# Patient Record
Sex: MALE | Race: BLACK OR AFRICAN AMERICAN | Employment: FULL TIME | URBAN - METROPOLITAN AREA
[De-identification: names, ages, dates, MRNs, and addresses within clinical notes are randomized per-mention and may not be internally consistent; named-entity substitution may affect disease eponyms.]

---

## 2017-07-07 LAB — HBA1C MFR BLD HPLC: 6.6 %

## 2017-09-05 ENCOUNTER — DOCUMENTATION ONLY (OUTPATIENT)
Dept: FAMILY MEDICINE CLINIC | Facility: CLINIC | Age: 57
End: 2017-09-05

## 2017-09-18 ENCOUNTER — OFFICE VISIT (OUTPATIENT)
Dept: FAMILY MEDICINE CLINIC | Facility: CLINIC | Age: 57
End: 2017-09-18

## 2017-09-18 VITALS
BODY MASS INDEX: 41.85 KG/M2 | HEIGHT: 72 IN | SYSTOLIC BLOOD PRESSURE: 152 MMHG | RESPIRATION RATE: 20 BRPM | OXYGEN SATURATION: 97 % | HEART RATE: 64 BPM | TEMPERATURE: 98.2 F | WEIGHT: 309 LBS | DIASTOLIC BLOOD PRESSURE: 80 MMHG

## 2017-09-18 DIAGNOSIS — M17.0 OSTEOARTHRITIS OF BOTH KNEES, UNSPECIFIED OSTEOARTHRITIS TYPE: ICD-10-CM

## 2017-09-18 DIAGNOSIS — E11.9 CONTROLLED TYPE 2 DIABETES MELLITUS WITHOUT COMPLICATION, WITHOUT LONG-TERM CURRENT USE OF INSULIN (HCC): Primary | ICD-10-CM

## 2017-09-18 DIAGNOSIS — M25.562 PAIN IN BOTH KNEES, UNSPECIFIED CHRONICITY: ICD-10-CM

## 2017-09-18 DIAGNOSIS — F32.A DEPRESSION, UNSPECIFIED DEPRESSION TYPE: ICD-10-CM

## 2017-09-18 DIAGNOSIS — M25.561 PAIN IN BOTH KNEES, UNSPECIFIED CHRONICITY: ICD-10-CM

## 2017-09-18 DIAGNOSIS — Z76.89 ENCOUNTER TO ESTABLISH CARE: ICD-10-CM

## 2017-09-18 DIAGNOSIS — I10 HTN, GOAL BELOW 130/80: ICD-10-CM

## 2017-09-18 LAB
HBA1C MFR BLD HPLC: 6.4 %
MICROALBUMIN UR TEST STR-MCNC: 10 MG/L
MICROALBUMIN/CREAT RATIO POC: <30 MG/G

## 2017-09-18 RX ORDER — LISINOPRIL AND HYDROCHLOROTHIAZIDE 20; 25 MG/1; MG/1
TABLET ORAL DAILY
COMMUNITY
End: 2017-09-18 | Stop reason: ALTCHOICE

## 2017-09-18 RX ORDER — IBUPROFEN 600 MG/1
TABLET ORAL AS NEEDED
COMMUNITY
Start: 2017-08-03 | End: 2018-05-21 | Stop reason: ALTCHOICE

## 2017-09-18 RX ORDER — LISINOPRIL 20 MG/1
20 TABLET ORAL DAILY
Qty: 90 TAB | Refills: 1 | Status: SHIPPED | OUTPATIENT
Start: 2017-09-18 | End: 2018-01-22 | Stop reason: ALTCHOICE

## 2017-09-18 RX ORDER — LORATADINE 10 MG/1
10 TABLET ORAL
COMMUNITY
End: 2018-01-22

## 2017-09-18 RX ORDER — TAMSULOSIN HYDROCHLORIDE 0.4 MG/1
CAPSULE ORAL
COMMUNITY
Start: 2017-08-03 | End: 2018-01-22 | Stop reason: SDUPTHER

## 2017-09-18 RX ORDER — NIFEDIPINE 60 MG/1
60 TABLET, EXTENDED RELEASE ORAL DAILY
Qty: 90 TAB | Refills: 1 | Status: SHIPPED | OUTPATIENT
Start: 2017-09-18 | End: 2018-04-23 | Stop reason: ALTCHOICE

## 2017-09-18 RX ORDER — ASPIRIN 81 MG/1
81 TABLET ORAL DAILY
Qty: 120 TAB | Refills: 1 | Status: SHIPPED | OUTPATIENT
Start: 2017-09-18 | End: 2018-03-23 | Stop reason: SDUPTHER

## 2017-09-18 RX ORDER — GABAPENTIN 300 MG/1
600 CAPSULE ORAL
COMMUNITY
End: 2018-01-22

## 2017-09-18 RX ORDER — OXYMETAZOLINE HCL 0.05 %
2 SPRAY, NON-AEROSOL (ML) NASAL 2 TIMES DAILY
COMMUNITY
End: 2018-01-22

## 2017-09-18 RX ORDER — MELATONIN
DAILY
COMMUNITY
End: 2018-01-22

## 2017-09-18 RX ORDER — FLUTICASONE PROPIONATE 50 MCG
2 SPRAY, SUSPENSION (ML) NASAL DAILY
COMMUNITY
End: 2018-02-28 | Stop reason: SDUPTHER

## 2017-09-18 RX ORDER — BUPROPION HYDROCHLORIDE 150 MG/1
TABLET, EXTENDED RELEASE ORAL 2 TIMES DAILY
COMMUNITY
End: 2018-01-22

## 2017-09-18 RX ORDER — TRAMADOL HYDROCHLORIDE 50 MG/1
50 TABLET ORAL
COMMUNITY
End: 2019-07-02

## 2017-09-18 NOTE — PATIENT INSTRUCTIONS
Arthritis: Care Instructions  Your Care Instructions  Arthritis, also called osteoarthritis, is a breakdown of the cartilage that cushions your joints. When the cartilage wears down, your bones rub against each other. This causes pain and stiffness. Many people have some arthritis as they age. Arthritis most often affects the joints of the spine, hands, hips, knees, or feet. You can take simple measures to protect your joints, ease your pain, and help you stay active. Follow-up care is a key part of your treatment and safety. Be sure to make and go to all appointments, and call your doctor if you are having problems. It's also a good idea to know your test results and keep a list of the medicines you take. How can you care for yourself at home? · Stay at a healthy weight. Being overweight puts extra strain on your joints. · Talk to your doctor or physical therapist about exercises that will help ease joint pain. ¨ Stretch. You may enjoy gentle forms of yoga to help keep your joints and muscles flexible. ¨ Walk instead of jog. Other types of exercise that are less stressful on the joints include riding a bicycle, swimming, catalino chi, or water exercise. ¨ Lift weights. Strong muscles help reduce stress on your joints. Stronger thigh muscles, for example, take some of the stress off of the knees and hips. Learn the right way to lift weights so you do not make joint pain worse. · Take your medicines exactly as prescribed. Call your doctor if you think you are having a problem with your medicine. · Take pain medicines exactly as directed. ¨ If the doctor gave you a prescription medicine for pain, take it as prescribed. ¨ If you are not taking a prescription pain medicine, ask your doctor if you can take an over-the-counter medicine. · Use a cane, crutch, walker, or another device if you need help to get around. These can help rest your joints.  You also can use other things to make life easier, such as a higher toilet seat and padded handles on kitchen utensils. · Do not sit in low chairs, which can make it hard to get up. · Put heat or cold on your sore joints as needed. Use whichever helps you most. You also can take turns with hot and cold packs. ¨ Apply heat 2 or 3 times a day for 20 to 30 minutesusing a heating pad, hot shower, or hot packto relieve pain and stiffness. ¨ Put ice or a cold pack on your sore joint for 10 to 20 minutes at a time. Put a thin cloth between the ice and your skin. When should you call for help? Call your doctor now or seek immediate medical care if:  · You have sudden swelling, warmth, or pain in any joint. · You have joint pain and a fever or rash. · You have such bad pain that you cannot use a joint. Watch closely for changes in your health, and be sure to contact your doctor if:  · You have mild joint symptoms that continue even with more than 6 weeks of care at home. · You have stomach pain or other problems with your medicine. Where can you learn more? Go to http://melisaPin-Digitaljerome.info/. Enter K138 in the search box to learn more about \"Arthritis: Care Instructions. \"  Current as of: November 28, 2016  Content Version: 11.3  © 8262-3265 [employer]. Care instructions adapted under license by [employer] (which disclaims liability or warranty for this information). If you have questions about a medical condition or this instruction, always ask your healthcare professional. [place] [address] any warranty or liability for your use of this information. Type 2 Diabetes: Care Instructions  Your Care Instructions  Type 2 diabetes is a disease that develops when the body's tissues cannot use insulin properly. Over time, the pancreas cannot make enough insulin. Insulin is a hormone that helps the body's cells use sugar (glucose) for energy.  It also helps the body store extra sugar in muscle, fat, and liver cells.  Without insulin, the sugar cannot get into the cells to do its work. It stays in the blood instead. This can cause high blood sugar levels. A person has diabetes when the blood sugar stays too high too much of the time. Over time, diabetes can lead to diseases of the heart, blood vessels, nerves, kidneys, and eyes. You may be able to control your blood sugar by losing weight, eating a healthy diet, and getting daily exercise. You may also have to take insulin or other diabetes medicine. Follow-up care is a key part of your treatment and safety. Be sure to make and go to all appointments. Call your doctor if you are having problems. It's also a good idea to know your test results and keep a list of the medicines you take. How can you care for yourself at home? · Keep your blood sugar at a target level (which you set with your doctor). ¨ Eat a good diet that spreads carbohydrate throughout the day. Carbohydratethe body's main source of fuelaffects blood sugar more than any other nutrient. Carbohydrate is in fruits, vegetables, milk, and yogurt. It also is in breads, cereals, vegetables such as potatoes and corn, and sugary foods such as candy and cakes. ¨ Aim for 30 minutes of exercise on most, preferably all, days of the week. Walking is a good choice. You also may want to do other activities, such as running, swimming, cycling, or playing tennis or team sports. If your doctor says it's okay, do muscle-strengthening exercises at least 2 times a week. ¨ Take your medicines exactly as prescribed. Call your doctor if you think you are having a problem with your medicine. You will get more details on the specific medicines your doctor prescribes. · Check your blood sugar as often as your doctor recommends. It is important to keep track of any symptoms you have, such as low blood sugar. Also tell your doctor if you have any changes in your activities, diet, or insulin use.   · Talk to your doctor before you start taking aspirin every day. Aspirin can help certain people lower their risk of a heart attack or stroke. But taking aspirin isn't right for everyone, because it can cause serious bleeding. · Do not smoke. If you need help quitting, talk to your doctor about stop-smoking programs and medicines. These can increase your chances of quitting for good. · Keep your cholesterol and blood pressure at normal levels. You may need to take one or more medicines to reach your goals. Take them exactly as directed. Do not stop or change a medicine without talking to your doctor first.  When should you call for help? Call 911 anytime you think you may need emergency care. For example, call if:  · You passed out (lost consciousness), or you suddenly become very sleepy or confused. (You may have very low blood sugar.)  Call your doctor now or seek immediate medical care if:  · Your blood sugar is 300 mg/dL or is higher than the level your doctor has set for you. · You have symptoms of low blood sugar, such as:  ¨ Sweating. ¨ Feeling nervous, shaky, and weak. ¨ Extreme hunger and slight nausea. ¨ Dizziness and headache. ¨ Blurred vision. ¨ Confusion. Watch closely for changes in your health, and be sure to contact your doctor if:  · You often have problems controlling your blood sugar. · You have symptoms of long-term diabetes problems, such as:  ¨ New vision changes. ¨ New pain, numbness, or tingling in your hands or feet. ¨ Skin problems. Where can you learn more? Go to http://melisa-jerome.info/. Enter C553 in the search box to learn more about \"Type 2 Diabetes: Care Instructions. \"  Current as of: March 13, 2017  Content Version: 11.3  © 5079-2699 [employer]. Care instructions adapted under license by [employer] (which disclaims liability or warranty for this information).  If you have questions about a medical condition or this instruction, always ask your healthcare professional. [address] any warranty or liability for your use of this information.

## 2017-09-18 NOTE — MR AVS SNAPSHOT
Visit Information Date & Time Provider Department Dept. Phone Encounter #  
 9/18/2017  3:30 PM Jayla Hahn NP [facility] [phone] [id number] Follow-up Instructions Return in about 2 weeks (around 10/2/2017), or if symptoms worsen or fail to improve, for HTN. Upcoming Health Maintenance Date Due Hepatitis C Screening 1960 DTaP/Tdap/Td series (1 - Tdap) 4/22/1981 FOBT Q 1 YEAR AGE 50-75 4/22/2010 INFLUENZA AGE 9 TO ADULT 8/1/2017 Allergies as of 9/18/2017  Review Complete On: 9/18/2017 By: Kirk Gonzalez Not on File Current Immunizations  Never Reviewed No immunizations on file. Not reviewed this visit You Were Diagnosed With   
  
 Codes Comments Controlled type 2 diabetes mellitus without complication, without long-term current use of insulin ([facility]    -  Primary ICD-10-CM: E11.9 ICD-9-CM: 250.00   
 HTN, goal below 130/80     ICD-10-CM: I10 
ICD-9-CM: 401.9 Osteoarthritis of both knees, unspecified osteoarthritis type     ICD-10-CM: M17.0 ICD-9-CM: 715.96 Pain in both knees, unspecified chronicity     ICD-10-CM: M25.561, M25.562 ICD-9-CM: 719.46 Vitals BP Pulse Temp Resp Height(growth percentile) Weight(growth percentile) 152/80 64 98.2 °F (36.8 °C) 20 5' 11.5\" (1.816 m) 309 lb (140.2 kg) SpO2 BMI Smoking Status 97% 42.5 kg/m2 Never Smoker Vitals History BMI and BSA Data Body Mass Index Body Surface Area 42.5 kg/m 2 2.66 m 2 Preferred Pharmacy Pharmacy Name Phone [address], [facility] [address] [phone] Your Updated Medication List  
  
   
This list is accurate as of: 9/18/17  3:59 PM.  Always use your most recent med list.  
  
  
  
  
 aspirin delayed-release 81 mg tablet Take 1 Tab by mouth daily. lisinopril 20 mg tablet Commonly known as:  Tang Cassidy  
 Take 1 Tab by mouth daily. NIFEdipine ER 60 mg ER tablet Commonly known as:  ADALAT CC Take 1 Tab by mouth daily. tamsulosin 0.4 mg capsule Commonly known as:  FLOMAX Prescriptions Sent to Pharmacy Refills  
 lisinopril (PRINIVIL, ZESTRIL) 20 mg tablet 1 Sig: Take 1 Tab by mouth daily. Class: Normal  
 Pharmacy: [address] [place] [address] Ph #: [phone] Route: Oral  
 NIFEdipine ER (ADALAT CC) 60 mg ER tablet 1 Sig: Take 1 Tab by mouth daily. Class: Normal  
 Pharmacy: [address] [place] [address] Ph #: [phone] Route: Oral  
 aspirin delayed-release 81 mg tablet 1 Sig: Take 1 Tab by mouth daily. Class: Normal  
 Pharmacy: [address] [place] [address] Ph #: [phone] Route: Oral  
  
We Performed the Following AMB POC HEMOGLOBIN A1C [02137 CPT(R)] AMB POC URINE, MICROALBUMIN, SEMIQUANTITATIVE [55058 CPT(R)] REFERRAL TO OPHTHALMOLOGY [REF57 Custom] Follow-up Instructions Return in about 2 weeks (around 10/2/2017), or if symptoms worsen or fail to improve, for HTN. Referral Information Referral ID Referred By Referred To  
  
 [id number] Christin Prado [address], [address] [place] [address] [phone] Phone: [phone] Fax: [phone] Visits Status Start Date End Date 1 New Request 9/18/17 9/18/18 If your referral has a status of pending review or denied, additional information will be sent to support the outcome of this decision. Patient Instructions Arthritis: Care Instructions Your Care Instructions Arthritis, also called osteoarthritis, is a breakdown of the cartilage that cushions your joints.  When the cartilage wears down, your bones rub against each other. This causes pain and stiffness. Many people have some arthritis as they age. Arthritis most often affects the joints of the spine, hands, hips, knees, or feet. You can take simple measures to protect your joints, ease your pain, and help you stay active. Follow-up care is a key part of your treatment and safety. Be sure to make and go to all appointments, and call your doctor if you are having problems. It's also a good idea to know your test results and keep a list of the medicines you take. How can you care for yourself at home? · Stay at a healthy weight. Being overweight puts extra strain on your joints. · Talk to your doctor or physical therapist about exercises that will help ease joint pain. ¨ Stretch. You may enjoy gentle forms of yoga to help keep your joints and muscles flexible. ¨ Walk instead of jog. Other types of exercise that are less stressful on the joints include riding a bicycle, swimming, catalino chi, or water exercise. ¨ Lift weights. Strong muscles help reduce stress on your joints. Stronger thigh muscles, for example, take some of the stress off of the knees and hips. Learn the right way to lift weights so you do not make joint pain worse. · Take your medicines exactly as prescribed. Call your doctor if you think you are having a problem with your medicine. · Take pain medicines exactly as directed. ¨ If the doctor gave you a prescription medicine for pain, take it as prescribed. ¨ If you are not taking a prescription pain medicine, ask your doctor if you can take an over-the-counter medicine. · Use a cane, crutch, walker, or another device if you need help to get around. These can help rest your joints. You also can use other things to make life easier, such as a higher toilet seat and padded handles on kitchen utensils. · Do not sit in low chairs, which can make it hard to get up. · Put heat or cold on your sore joints as needed.  Use whichever helps you most. You also can take turns with hot and cold packs. ¨ Apply heat 2 or 3 times a day for 20 to 30 minutesusing a heating pad, hot shower, or hot packto relieve pain and stiffness. ¨ Put ice or a cold pack on your sore joint for 10 to 20 minutes at a time. Put a thin cloth between the ice and your skin. When should you call for help? Call your doctor now or seek immediate medical care if: 
· You have sudden swelling, warmth, or pain in any joint. · You have joint pain and a fever or rash. · You have such bad pain that you cannot use a joint. Watch closely for changes in your health, and be sure to contact your doctor if: 
· You have mild joint symptoms that continue even with more than 6 weeks of care at home. · You have stomach pain or other problems with your medicine. Where can you learn more? Go to http://melisaAR LLCjerome.info/. Enter G454 in the search box to learn more about \"Arthritis: Care Instructions. \" Current as of: November 28, 2016 Content Version: 11.3 © 2499-2156 [employer]. Care instructions adapted under license by [employer] (which disclaims liability or warranty for this information). If you have questions about a medical condition or this instruction, always ask your healthcare professional. [address] any warranty or liability for your use of this information. Type 2 Diabetes: Care Instructions Your Care Instructions Type 2 diabetes is a disease that develops when the body's tissues cannot use insulin properly. Over time, the pancreas cannot make enough insulin. Insulin is a hormone that helps the body's cells use sugar (glucose) for energy. It also helps the body store extra sugar in muscle, fat, and liver cells. Without insulin, the sugar cannot get into the cells to do its work. It stays in the blood instead. This can cause high blood sugar levels.  A person has diabetes when the blood sugar stays too high too much of the time. Over time, diabetes can lead to diseases of the heart, blood vessels, nerves, kidneys, and eyes. You may be able to control your blood sugar by losing weight, eating a healthy diet, and getting daily exercise. You may also have to take insulin or other diabetes medicine. Follow-up care is a key part of your treatment and safety. Be sure to make and go to all appointments. Call your doctor if you are having problems. It's also a good idea to know your test results and keep a list of the medicines you take. How can you care for yourself at home? · Keep your blood sugar at a target level (which you set with your doctor). ¨ Eat a good diet that spreads carbohydrate throughout the day. Carbohydratethe body's main source of fuelaffects blood sugar more than any other nutrient. Carbohydrate is in fruits, vegetables, milk, and yogurt. It also is in breads, cereals, vegetables such as potatoes and corn, and sugary foods such as candy and cakes. ¨ Aim for 30 minutes of exercise on most, preferably all, days of the week. Walking is a good choice. You also may want to do other activities, such as running, swimming, cycling, or playing tennis or team sports. If your doctor says it's okay, do muscle-strengthening exercises at least 2 times a week. ¨ Take your medicines exactly as prescribed. Call your doctor if you think you are having a problem with your medicine. You will get more details on the specific medicines your doctor prescribes. · Check your blood sugar as often as your doctor recommends. It is important to keep track of any symptoms you have, such as low blood sugar. Also tell your doctor if you have any changes in your activities, diet, or insulin use. · Talk to your doctor before you start taking aspirin every day. Aspirin can help certain people lower their risk of a heart attack or stroke.  But taking aspirin isn't right for everyone, because it can cause serious bleeding. · Do not smoke. If you need help quitting, talk to your doctor about stop-smoking programs and medicines. These can increase your chances of quitting for good. · Keep your cholesterol and blood pressure at normal levels. You may need to take one or more medicines to reach your goals. Take them exactly as directed. Do not stop or change a medicine without talking to your doctor first. 
When should you call for help? Call 911 anytime you think you may need emergency care. For example, call if: 
· You passed out (lost consciousness), or you suddenly become very sleepy or confused. (You may have very low blood sugar.) Call your doctor now or seek immediate medical care if: 
· Your blood sugar is 300 mg/dL or is higher than the level your doctor has set for you. · You have symptoms of low blood sugar, such as: ¨ Sweating. ¨ Feeling nervous, shaky, and weak. ¨ Extreme hunger and slight nausea. ¨ Dizziness and headache. ¨ Blurred vision. ¨ Confusion. Watch closely for changes in your health, and be sure to contact your doctor if: 
· You often have problems controlling your blood sugar. · You have symptoms of long-term diabetes problems, such as: ¨ New vision changes. ¨ New pain, numbness, or tingling in your hands or feet. ¨ Skin problems. Where can you learn more? Go to http://melisa-jerome.info/. Enter C553 in the search box to learn more about \"Type 2 Diabetes: Care Instructions. \" Current as of: March 13, 2017 Content Version: 11.3 © 9892-9866 [employer]. Care instructions adapted under license by [employer] (which disclaims liability or warranty for this information). If you have questions about a medical condition or this instruction, always ask your healthcare professional. [address] any warranty or liability for your use of this information. Introducing [facility]! Daryl Jovel introduces [employer] patient portal. Now you can access parts of your medical record, email your doctor's office, and request medication refills online. 1. In your internet browser, go to [email or web address] 2. Click on the First Time User? Click Here link in the Sign In box. You will see the New Member Sign Up page. 3. Enter your [employer] Access Code exactly as it appears below. You will not need to use this code after youve completed the sign-up process. If you do not sign up before the expiration date, you must request a new code. · [employer] Access Code: [email or web address] Expires: 11/30/2017 10:37 AM 
 
4. Enter the last four digits of your Social Security Number (xxxx) and Date of Birth (mm/dd/yyyy) as indicated and click Submit. You will be taken to the next sign-up page. 5. Create a [employer] ID. This will be your [employer] login ID and cannot be changed, so think of one that is secure and easy to remember. 6. Create a [employer] password. You can change your password at any time. 7. Enter your Password Reset Question and Answer. This can be used at a later time if you forget your password. 8. Enter your e-mail address. You will receive e-mail notification when new information is available in [address] 9. Click Sign Up. You can now view and download portions of your medical record. 10. Click the Download Summary menu link to download a portable copy of your medical information. If you have questions, please visit the Frequently Asked Questions section of the [employer] website. Remember, [employer] is NOT to be used for urgent needs. For medical emergencies, dial 911. Now available from your iPhone and Android! Please provide this summary of care documentation to your next provider. Your primary care clinician is listed as [address] [facility] [employer]. If you have any questions after today's visit, please call [phone].

## 2017-09-19 PROBLEM — E11.9 CONTROLLED TYPE 2 DIABETES MELLITUS WITHOUT COMPLICATION, WITHOUT LONG-TERM CURRENT USE OF INSULIN (HCC): Status: ACTIVE | Noted: 2017-09-19

## 2017-09-19 PROBLEM — F32.A DEPRESSION: Status: ACTIVE | Noted: 2017-09-19

## 2017-09-19 PROBLEM — I10 HTN, GOAL BELOW 130/80: Status: ACTIVE | Noted: 2017-09-19

## 2017-09-19 PROBLEM — M17.0 OSTEOARTHRITIS OF BOTH KNEES: Status: ACTIVE | Noted: 2017-09-19

## 2017-09-19 NOTE — PROGRESS NOTES
HISTORY OF PRESENT ILLNESS  Chilo Harris is a 62 y.o. male. HPI Comments: New pt to practice. Presents with pertinent PMHx to include HTN, DM, osteoarthritis of both knees-requiring future surgery, depression, GERD. HTN: elevated BP today, he has not been taking his Nifidipine daily since running out of this. He takes his lisinopril/hctz daily however this makes his urinate a lot. He notes that he sometimes has to stop while [place] the [place] to find a bathroom. Denies any leg swelling or palpitation. Notes substernal chest pain every other day which lasts about 40 - 50 mins. He reports this is occurs at rest or with activity. Denies any radiation of pain. He does not take any medication for this, since it resolves on its own. He admits he has been stressed a lot with some home-life issues. DM: not medicated at this time. A1c today is 6.4, he has been trying to manage this with weight loss and diet control. Reports he has lost about 45 lbs recently although he tells me that has gained about 5 lbs back. Knee pain: c/o bilateral knee pain, ambulating with a straight cane today. He usually takes tramadol and motrin for this which helps. He has been told that he will need a knee replacement on both knees. He tells me that his previous PCP was concerned about his BP hence the delay with the surgery. He tells me that he will like to have his BP under control in order that he might move forward with surgery. Depression: currently on wellbutrin and gabapentin. He is followed by psych, although he tells me that he has not seen psych in about 4-6 months. Reports some stressful situations and sleeping problems. He was told to try gabapentin for sleep but reports he does not like the \"groggy/swimming in water\" feeling that it gives him when he wakes up.      Pressure behind eyes: reports this began months ago, he tells me that he recently had an evaluation through the [place] for his disability but he has not had an ophthalmology assessment r/t this complaint. Denies any pain in his eyes or blurred vision. He reports he had an FOBT this year which was normal. He is refusing the influenza vaccine today. Establish Care   The history is provided by the patient. This is a new problem. Associated symptoms include chest pain. Pertinent negatives include no shortness of breath. Knee Pain   The history is provided by the patient. This is a chronic problem. The current episode started more than 1 week ago. The problem occurs daily. The problem has not changed since onset. Associated symptoms include chest pain. Pertinent negatives include no shortness of breath. Treatments tried: tramadol, motrin. The treatment provided mild relief. Chest Pain (Angina)    The history is provided by the patient. This is a new problem. The current episode started more than 1 week ago. The problem has not changed since onset. Duration of episode(s) is 40 minutes. The problem occurs every several days. The pain is associated with normal activity and stress. The pain is present in the substernal region. The pain is at a severity of 4/10. The pain is mild. The quality of the pain is described as pressure-like. The pain does not radiate. Pertinent negatives include no back pain, no cough, no diaphoresis, no fever, no irregular heartbeat, no palpitations and no shortness of breath. He has tried nothing for the symptoms. Risk factors include obesity, diabetes mellitus, hypertension and male gender. His past medical history is significant for DM and HTN. Pressure Behind the Eyes    The history is provided by the patient. This is a new problem. The current episode started more than 1 week ago. The problem has not changed since onset. There has been no fever. Associated symptoms include chest pain. Pertinent negatives include no sinus pressure, no sore throat, no cough, no shortness of breath, no neck pain and no neck pain.  He has tried nothing for the symptoms. Hypertension    The history is provided by the patient. This is a chronic problem. The current episode started more than 1 week ago. The problem has been gradually worsening. Associated symptoms include chest pain. Pertinent negatives include no palpitations, no neck pain and no shortness of breath. Risk factors include obesity, male gender, non-compliant and diabetes mellitus. Stress   The history is provided by the patient. This is a recurrent problem. The current episode started more than 1 week ago. The problem occurs daily. The problem has not changed since onset. Associated symptoms include chest pain. Pertinent negatives include no shortness of breath. He has tried nothing for the symptoms. Diabetes   The history is provided by the patient. This is a chronic problem. The current episode started more than 1 week ago. The problem has been gradually improving. Associated symptoms include chest pain. Pertinent negatives include no shortness of breath. He has tried nothing for the symptoms. Review of Systems   Constitutional: Negative for diaphoresis and fever. HENT: Negative for sinus pressure and sore throat. Eyes:        \"pressure behind eyes\"   Respiratory: Negative. Negative for cough and shortness of breath. Cardiovascular: Positive for chest pain. Negative for palpitations. Gastrointestinal: Negative. Genitourinary: Negative. Musculoskeletal: Negative for back pain and neck pain. Skin: Negative. Neurological: Negative. Endo/Heme/Allergies: Positive for environmental allergies. Psychiatric/Behavioral: Positive for depression. Past Medical History:   Diagnosis Date    Depression     Diabetes ([address]     Heartburn     Hypertension     Joint pain     Kidney stone     Sinus disorder     Stress      History reviewed. No pertinent surgical history. No current outpatient prescriptions on file prior to visit.      No current facility-administered medications on file prior to visit. Allergies and Intolerances:   No Known Allergies    Family History:   Family History   Problem Relation Age of Onset    Diabetes Mother     Hypertension Father     Cancer Maternal Grandfather     Cancer Paternal Grandfather      pancreatic       Social History:   He  reports that he has never smoked. He has never used smokeless tobacco. He  reports that he drinks alcohol. Vitals:   Visit Vitals    /80    Pulse 64    Temp 98.2 °F (36.8 °C)    Resp 20    Ht 5' 11.5\" (1.816 m)    Wt 309 lb (140.2 kg)    SpO2 97%    BMI 42.5 kg/m2     Body surface area is 2.66 meters squared. Recent Results (from the past 24 hour(s))   AMB POC URINE, MICROALBUMIN, SEMIQUANTITATIVE    Collection Time: 09/18/17  3:00 PM   Result Value Ref Range    Microalbumin urine (POC) 10 MG/L    Microalbumin/creat ratio (POC) <30 MG/G   AMB POC HEMOGLOBIN A1C    Collection Time: 09/18/17  3:10 PM   Result Value Ref Range    Hemoglobin A1c (POC) 6.4 %       Physical Exam   Constitutional: He is oriented to person, place, and time. He appears well-developed and well-nourished. HENT:   Head: Atraumatic. Right Ear: External ear normal.   Left Ear: External ear normal.   Nose: Nose normal.   Mouth/Throat: Oropharynx is clear and moist.   Eyes: Pupils are equal, round, and reactive to light. Neck: Normal range of motion. Cardiovascular: Normal rate and regular rhythm. Pulmonary/Chest: Effort normal and breath sounds normal.   Abdominal: Soft. Musculoskeletal:        Right knee: He exhibits decreased range of motion and bony tenderness. Left knee: He exhibits decreased range of motion and bony tenderness. Neurological: He is alert and oriented to person, place, and time. Skin: Skin is warm. Psychiatric: He has a normal mood and affect. His speech is normal and behavior is normal. Thought content normal.   Nursing note and vitals reviewed.       ASSESSMENT and PLAN ICD-10-CM ICD-9-CM    1. Controlled type 2 diabetes mellitus without complication, without long-term current use of insulin (HCC) E11.9 250.00 AMB POC HEMOGLOBIN A1C      AMB POC URINE, MICROALBUMIN, SEMIQUANTITATIVE      REFERRAL TO OPHTHALMOLOGY   2. HTN, goal below 130/80- discontinue HCTZ. I10 401.9 lisinopril (PRINIVIL, ZESTRIL) 20 mg tablet      NIFEdipine ER (ADALAT CC) 60 mg ER tablet      aspirin delayed-release 81 mg tablet      REFERRAL TO OPHTHALMOLOGY   3. Osteoarthritis of both knees, unspecified osteoarthritis type M17.0 715.96    4. Pain in both knees, unspecified chronicity- continue analgesics. M25.561 719.46     M25.562     5. Encounter to establish care Z76.89 V65.8    6. Depression, unspecified depression type - he has been advised to schedule an appt with psych as soon as possible. F32.9 311      Follow-up Disposition:  Return in about 2 weeks (around 10/2/2017), or if symptoms worsen or fail to improve, for HTN.  lab results and schedule of future lab studies reviewed with patient  cardiovascular risk and specific lipid/LDL goals reviewed  reviewed medications and side effects in detail  specific diabetic recommendations: annual eye examinations at Ophthalmology discussed, glycohemoglobin and other lab monitoring discussed and long term diabetic complications discussed  use of aspirin to prevent MI and TIA's discussed  Will request non-[facility] medical records. - Alarm signals discussed. ER precautions  - Plan of care reviewed with patient. Understanding verbalized and they are in agreement with plan of care. I have reviewed this encounter and agree with Ms. Amador's plan.   Charlene Alfonso MD

## 2018-01-22 ENCOUNTER — OFFICE VISIT (OUTPATIENT)
Dept: FAMILY MEDICINE CLINIC | Facility: CLINIC | Age: 58
End: 2018-01-22

## 2018-01-22 VITALS
HEIGHT: 72 IN | OXYGEN SATURATION: 98 % | WEIGHT: 314 LBS | BODY MASS INDEX: 42.53 KG/M2 | TEMPERATURE: 97.8 F | SYSTOLIC BLOOD PRESSURE: 162 MMHG | RESPIRATION RATE: 16 BRPM | DIASTOLIC BLOOD PRESSURE: 102 MMHG | HEART RATE: 71 BPM

## 2018-01-22 DIAGNOSIS — N40.1 BENIGN PROSTATIC HYPERPLASIA WITH URINARY FREQUENCY: ICD-10-CM

## 2018-01-22 DIAGNOSIS — I10 HTN, GOAL BELOW 140/80: ICD-10-CM

## 2018-01-22 DIAGNOSIS — E11.9 CONTROLLED TYPE 2 DIABETES MELLITUS WITHOUT COMPLICATION, WITHOUT LONG-TERM CURRENT USE OF INSULIN (HCC): Primary | ICD-10-CM

## 2018-01-22 DIAGNOSIS — M79.671 RIGHT FOOT PAIN: ICD-10-CM

## 2018-01-22 DIAGNOSIS — R35.0 BENIGN PROSTATIC HYPERPLASIA WITH URINARY FREQUENCY: ICD-10-CM

## 2018-01-22 LAB — HBA1C MFR BLD HPLC: 6.6 %

## 2018-01-22 RX ORDER — AZELASTINE 1 MG/ML
SPRAY, METERED NASAL
COMMUNITY
Start: 2017-10-15 | End: 2018-02-19 | Stop reason: SDUPTHER

## 2018-01-22 RX ORDER — TAMSULOSIN HYDROCHLORIDE 0.4 MG/1
0.4 CAPSULE ORAL DAILY
Qty: 90 CAP | Refills: 1 | Status: SHIPPED | OUTPATIENT
Start: 2018-01-22

## 2018-01-22 RX ORDER — LIDOCAINE 50 MG/G
1 PATCH TOPICAL EVERY 12 HOURS
COMMUNITY
Start: 2017-10-15 | End: 2019-07-02

## 2018-01-22 RX ORDER — PIROXICAM 20 MG/1
20 CAPSULE ORAL DAILY
COMMUNITY
Start: 2017-10-15 | End: 2018-05-21 | Stop reason: ALTCHOICE

## 2018-01-22 RX ORDER — PREDNISONE 10 MG/1
40 TABLET ORAL
Qty: 12 TAB | Refills: 0 | Status: SHIPPED | OUTPATIENT
Start: 2018-01-22 | End: 2018-01-25

## 2018-01-22 RX ORDER — LISINOPRIL 40 MG/1
40 TABLET ORAL DAILY
Qty: 90 TAB | Refills: 1 | Status: SHIPPED | OUTPATIENT
Start: 2018-01-22 | End: 2018-04-23 | Stop reason: ALTCHOICE

## 2018-01-22 RX ORDER — ERGOCALCIFEROL 1.25 MG/1
CAPSULE ORAL
COMMUNITY
Start: 2017-10-15 | End: 2018-02-19 | Stop reason: SDUPTHER

## 2018-01-22 NOTE — PROGRESS NOTES
HISTORY OF PRESENT ILLNESS  Miranda Herrera is a 62 y.o. male. HPI Comments: HTN: BP remains elevated. He is not compliant with medications all the time. BP is not measured at home. Denies any leg swelling or palpitations. DM: does not generally follow a diabetic diet. Recent weight gain. Compliant with home BG monitoring. Reports home BG ranges from 120-129. Last A1c 6.4. Foot pain: c/o right foot pain for more than 1 week. Does not recall any injury or accident to foot. Denies any swelling. Reports pain when foot is at an angle especially when driving. He has applied an ACE wrap to his foot with minimal relief. Urinary frequency: c/o urinary frequency and nocturia. Was on flomax in the past, he will like to restart this. Diabetes   The history is provided by the patient. This is a chronic problem. The current episode started more than 1 week ago. The problem occurs constantly. The problem has not changed since onset. He has tried nothing for the symptoms. Hypertension    The history is provided by the patient. This is a chronic problem. The current episode started more than 1 week ago. The problem has been rapidly worsening. Risk factors include non-compliant, obesity, male gender, a sedentary lifestyle and family history. Medication Evaluation   The history is provided by the patient. This is a new problem. Foot Pain   The history is provided by the patient. This is a new problem. The current episode started more than 1 week ago. The problem occurs constantly. The problem has not changed since onset. Treatments tried: ace wrap. Review of Systems   Constitutional: Negative. HENT: Negative. Respiratory: Negative. Musculoskeletal: Positive for joint pain (knee pain). Right foot pain     Skin: Negative. Neurological: Negative. Psychiatric/Behavioral: Negative.       Past Medical History:   Diagnosis Date    Depression     Diabetes ([facility] [address]     Heartburn     Hypertension     Joint pain     Kidney stone     Sinus disorder     Stress      No past surgical history on file. Current Outpatient Prescriptions on File Prior to Visit   Medication Sig Dispense Refill    NIFEdipine ER (ADALAT CC) 60 mg ER tablet Take 1 Tab by mouth daily. 90 Tab 1    aspirin delayed-release 81 mg tablet Take 1 Tab by mouth daily. 120 Tab 1    fluticasone (ALLERGY RELIEF, FLUTICASONE,) 50 mcg/actuation nasal spray 2 Sprays by Both Nostrils route daily.  ibuprofen (MOTRIN) 600 mg tablet as needed.  traMADol (ULTRAM) 50 mg tablet Take 50 mg by mouth every six (6) hours as needed for Pain. No current facility-administered medications on file prior to visit. Allergies and Intolerances:   No Known Allergies    Family History:   Family History   Problem Relation Age of Onset    Diabetes Mother     Hypertension Father     Cancer Maternal Grandfather     Cancer Paternal Grandfather      pancreatic       Social History:   He  reports that he has never smoked. He has never used smokeless tobacco. He  reports that he drinks alcohol. Vitals:   Visit Vitals    BP (!) 162/102    Pulse 71    Temp 97.8 °F (36.6 °C)    Resp 16    Ht 5' 11.5\" (1.816 m)    Wt 314 lb (142.4 kg)    SpO2 98%    BMI 43.18 kg/m2     Body surface area is 2.68 meters squared. Recent Results (from the past 12 hour(s))   AMB POC HEMOGLOBIN A1C    Collection Time: 01/22/18  3:00 PM   Result Value Ref Range    Hemoglobin A1c (POC) 6.6 %     Diabetic foot exam:     Left: Filament test normal sensation with micro filament   Pulse DP: 2+ (normal)   Pulse PT: 2+ (normal)   Deformities: None  Right: Filament test normal sensation with micro filament   Pulse DP: 2+ (normal)   Pulse PT: 2+ (normal)   Deformities: None        Physical Exam   Constitutional: He is oriented to person, place, and time. He appears well-developed and well-nourished. HENT:   Head: Atraumatic. Cardiovascular: Normal rate.     Pulmonary/Chest: Effort normal.   Neurological: He is alert and oriented to person, place, and time. Skin: Skin is warm. Psychiatric: He has a normal mood and affect. His behavior is normal.   Nursing note and vitals reviewed. ASSESSMENT and PLAN    ICD-10-CM ICD-9-CM    1. Controlled type 2 diabetes mellitus without complication, without long-term current use of insulin (HCC) E11.9 250.00 AMB POC HEMOGLOBIN A1C       DIABETES FOOT EXAM      REFERRAL TO PODIATRY   2. HTN, goal below 140/80 I10 401.9 lisinopril (PRINIVIL, ZESTRIL) 40 mg tablet   3. Right foot pain M79.671 729. 5 predniSONE (DELTASONE) 10 mg tablet      REFERRAL TO PODIATRY   4. Benign prostatic hyperplasia with urinary frequency N40.1 600.01 tamsulosin (FLOMAX) 0.4 mg capsule    R35.0 788.41      Follow-up Disposition:  Return in about 4 weeks (around 2/19/2018), or if symptoms worsen or fail to improve, for HTN.  lab results and schedule of future lab studies reviewed with patient  reviewed diet, exercise and weight control  reviewed medications and side effects in detail  specific diabetic recommendations: diabetic diet discussed in detail, written exchange diet given, low cholesterol diet, weight control and daily exercise discussed, home glucose monitoring emphasized, foot care discussed and Podiatry visits discussed, annual eye examinations at Ophthalmology discussed, glycohemoglobin and other lab monitoring discussed and long term diabetic complications discussed   Continue ACE wrap to foot. May wear insoles. - Alarm signals discussed. ER precautions  - Plan of care reviewed with patient. Understanding verbalized and they are in agreement with plan of care.

## 2018-01-22 NOTE — MR AVS SNAPSHOT
[address] [place] [id number] 
[phone] Patient: Ghislaine Parikh MRN: [id number] Visit Information Date & Time Provider Department Dept. Phone Encounter #  
 2018  2:30 PM Nikolas Fields NP [employer] [phone] [id number] Follow-up Instructions Return in about 4 weeks (around 2018), or if symptoms worsen or fail to improve, for HTN. Upcoming Health Maintenance Date Due Hepatitis C Screening 1960 LIPID PANEL Q1 1960 EYE EXAM RETINAL OR DILATED Q1 1970 Pneumococcal 19-64 Medium Risk (1 of 1 - PPSV23) 1979 DTaP/Tdap/Td series (1 - Tdap) 1981 FOBT Q 1 YEAR AGE 50-75 2010 HEMOGLOBIN A1C Q6M 2018 MICROALBUMIN Q1 2018 FOOT EXAM Q1 2019 Allergies as of 2018  Review Complete On: 2018 By: Rd Smith No Known Allergies Current Immunizations  Never Reviewed No immunizations on file. Not reviewed this visit You Were Diagnosed With   
  
 Codes Comments Controlled type 2 diabetes mellitus without complication, without long-term current use of insulin ([facility] [address]    -  Primary ICD-10-CM: E11.9 ICD-9-CM: 250.00   
 HTN, goal below 140/80     ICD-10-CM: I10 
ICD-9-CM: 401.9 Right foot pain     ICD-10-CM: M79.671 ICD-9-CM: 729.5 Benign prostatic hyperplasia with urinary frequency     ICD-10-CM: N40.1, R35.0 ICD-9-CM: 600.01, 788.41 Vitals BP Pulse Temp Resp Height(growth percentile) Weight(growth percentile) (!) 162/102 71 97.8 °F (36.6 °C) 16 5' 11.5\" (1.816 m) 314 lb (142.4 kg) SpO2 BMI Smoking Status 98% 43.18 kg/m2 Never Smoker Vitals History BMI and BSA Data Body Mass Index Body Surface Area  
 43.18 kg/m 2 2.68 m 2 Preferred Pharmacy Pharmacy Name Phone [address] [place] [address], [facility]. [facility] [address] [phone] Your Updated Medication List  
  
   
This list is accurate as of: 1/22/18  3:24 PM.  Always use your most recent med list.  
  
  
  
  
 ALLERGY RELIEF (FLUTICASONE) 50 mcg/actuation nasal spray Generic drug:  fluticasone 2 Sprays by Both Nostrils route daily. aspirin delayed-release 81 mg tablet Take 1 Tab by mouth daily. azelastine 137 mcg (0.1 %) nasal spray Commonly known as:  ASTELIN  
  
 ergocalciferol 50,000 unit capsule Commonly known as:  ERGOCALCIFEROL  
  
 ibuprofen 600 mg tablet Commonly known as:  MOTRIN  
as needed. lidocaine 5 % Commonly known as:  LIDODERM  
  
 lisinopril 40 mg tablet Commonly known as:  Ladona Dunks Take 1 Tab by mouth daily. NIFEdipine ER 60 mg ER tablet Commonly known as:  ADALAT CC Take 1 Tab by mouth daily. piroxicam 20 mg capsule Commonly known as:  FELDENE  
20 mg daily. predniSONE 10 mg tablet Commonly known as:  Donneta Manasa Take 4 Tabs by mouth daily (with breakfast) for 3 days. tamsulosin 0.4 mg capsule Commonly known as:  FLOMAX Take 1 Cap by mouth daily. traMADol 50 mg tablet Commonly known as:  ULTRAM  
Take 50 mg by mouth every six (6) hours as needed for Pain. Prescriptions Printed Refills  
 lisinopril (PRINIVIL, ZESTRIL) 40 mg tablet 1 Sig: Take 1 Tab by mouth daily. Class: Print Route: Oral  
 predniSONE (DELTASONE) 10 mg tablet 0 Sig: Take 4 Tabs by mouth daily (with breakfast) for 3 days. Class: Print Route: Oral  
 tamsulosin (FLOMAX) 0.4 mg capsule 1 Sig: Take 1 Cap by mouth daily. Class: Print Route: Oral  
  
We Performed the Following AMB POC HEMOGLOBIN A1C [62540 CPT(R)] HM DIABETES FOOT EXAM [HM7 Custom] REFERRAL TO PODIATRY [REF90 Custom] Comments:  
 Please evaluate patient for DM foot exam, foot pain. Follow-up Instructions Return in about 4 weeks (around 2/19/2018), or if symptoms worsen or fail to improve, for HTN. Referral Information Referral ID Referred By Referred To  
  
 [id number] NOELLE Garcia [address], [address] Phone: [phone] Fax: [phone] Visits Status Start Date End Date 1 New Request 1/22/18 1/22/19 If your referral has a status of pending review or denied, additional information will be sent to support the outcome of this decision. Patient Instructions DASH Diet: Care Instructions Your Care Instructions The DASH diet is an eating plan that can help lower your blood pressure. DASH stands for Dietary Approaches to Stop Hypertension. Hypertension is high blood pressure. The DASH diet focuses on eating foods that are high in calcium, potassium, and magnesium. These nutrients can lower blood pressure. The foods that are highest in these nutrients are fruits, vegetables, low-fat dairy products, nuts, seeds, and legumes. But taking calcium, potassium, and magnesium supplements instead of eating foods that are high in those nutrients does not have the same effect. The DASH diet also includes whole grains, fish, and poultry. The DASH diet is one of several lifestyle changes your doctor may recommend to lower your high blood pressure. Your doctor may also want you to decrease the amount of sodium in your diet. Lowering sodium while following the DASH diet can lower blood pressure even further than just the DASH diet alone. Follow-up care is a key part of your treatment and safety. Be sure to make and go to all appointments, and call your doctor if you are having problems. It's also a good idea to know your test results and keep a list of the medicines you take. How can you care for yourself at home? Following the DASH diet · Eat 4 to 5 servings of fruit each day. A serving is 1 medium-sized piece of fruit, ½ cup chopped or canned fruit, 1/4 cup dried fruit, or 4 ounces (½ cup) of fruit juice. Choose fruit more often than fruit juice. · Eat 4 to 5 servings of vegetables each day. A serving is 1 cup of lettuce or raw leafy vegetables, ½ cup of chopped or cooked vegetables, or 4 ounces (½ cup) of vegetable juice. Choose vegetables more often than vegetable juice. · Get 2 to 3 servings of low-fat and fat-free dairy each day. A serving is 8 ounces of milk, 1 cup of yogurt, or 1 ½ ounces of cheese. · Eat 6 to 8 servings of grains each day. A serving is 1 slice of bread, 1 ounce of dry cereal, or ½ cup of cooked rice, pasta, or cooked cereal. Try to choose whole-grain products as much as possible. · Limit lean meat, poultry, and fish to 2 servings each day. A serving is 3 ounces, about the size of a deck of cards. · Eat 4 to 5 servings of nuts, seeds, and legumes (cooked dried beans, lentils, and split peas) each week. A serving is 1/3 cup of nuts, 2 tablespoons of seeds, or ½ cup of cooked beans or peas. · Limit fats and oils to 2 to 3 servings each day. A serving is 1 teaspoon of vegetable oil or 2 tablespoons of salad dressing. · Limit sweets and added sugars to 5 servings or less a week. A serving is 1 tablespoon jelly or jam, ½ cup sorbet, or 1 cup of lemonade. · Eat less than 2,300 milligrams (mg) of sodium a day. If you limit your sodium to 1,500 mg a day, you can lower your blood pressure even more. Tips for success · Start small. Do not try to make dramatic changes to your diet all at once. You might feel that you are missing out on your favorite foods and then be more likely to not follow the plan. Make small changes, and stick with them. Once those changes become habit, add a few more changes. · Try some of the following: ¨ Make it a goal to eat a fruit or vegetable at every meal and at snacks. This will make it easy to get the recommended amount of fruits and vegetables each day. ¨ Try yogurt topped with fruit and nuts for a snack or healthy dessert. ¨ Add lettuce, tomato, cucumber, and onion to sandwiches. ¨ Combine a ready-made pizza crust with low-fat mozzarella cheese and lots of vegetable toppings. Try using tomatoes, squash, spinach, broccoli, carrots, cauliflower, and onions. ¨ Have a variety of cut-up vegetables with a low-fat dip as an appetizer instead of chips and dip. ¨ Sprinkle sunflower seeds or chopped almonds over salads. Or try adding chopped walnuts or almonds to cooked vegetables. ¨ Try some vegetarian meals using beans and peas. Add garbanzo or kidney beans to salads. Make burritos and tacos with mashed ca beans or black beans. Where can you learn more? Go to http://melisa-jerome.info/. Enter I676 in the search box to learn more about \"DASH Diet: Care Instructions. \" Current as of: September 21, 2016 Content Version: 11.4 © 4625-8281 [employer]. Care instructions adapted under license by [employer] (which disclaims liability or warranty for this information). If you have questions about a medical condition or this instruction, always ask your healthcare professional. [place] [address] any warranty or liability for your use of this information. Introducing hospitals & HEALTH SERVICES! [facility] introduces [employer] patient portal. Now you can access parts of your medical record, email your doctor's office, and request medication refills online. 1. In your internet browser, go to [email or web address]. [employer] 2. Click on the First Time User? Click Here link in the Sign In box. You will see the New Member Sign Up page. 3. Enter your [employer] Access Code exactly as it appears below. You will not need to use this code after youve completed the sign-up process.  If you do not sign up before the expiration date, you must request a new code. · [facility] Access Code: [email or web address] Expires: 4/22/2018  3:24 PM 
 
4. Enter the last four digits of your Social Security Number (xxxx) and Date of Birth (mm/dd/yyyy) as indicated and click Submit. You will be taken to the next sign-up page. 5. Create a [facility] ID. This will be your [facility] login ID and cannot be changed, so think of one that is secure and easy to remember. 6. Create a [facility] password. You can change your password at any time. 7. Enter your Password Reset Question and Answer. This can be used at a later time if you forget your password. 8. Enter your e-mail address. You will receive e-mail notification when new information is available in [address] 9. Click Sign Up. You can now view and download portions of your medical record. 10. Click the Download Summary menu link to download a portable copy of your medical information. If you have questions, please visit the Frequently Asked Questions section of the [facility] website. Remember, [facility] is NOT to be used for urgent needs. For medical emergencies, dial 911. Now available from your iPhone and Android! Please provide this summary of care documentation to your next provider. Your primary care clinician is listed as Gogo Yost. If you have any questions after today's visit, please call [phone].

## 2018-01-22 NOTE — PATIENT INSTRUCTIONS

## 2018-02-19 ENCOUNTER — OFFICE VISIT (OUTPATIENT)
Dept: FAMILY MEDICINE CLINIC | Facility: CLINIC | Age: 58
End: 2018-02-19

## 2018-02-19 VITALS
TEMPERATURE: 97.9 F | WEIGHT: 310 LBS | HEIGHT: 72 IN | SYSTOLIC BLOOD PRESSURE: 161 MMHG | HEART RATE: 57 BPM | BODY MASS INDEX: 41.99 KG/M2 | DIASTOLIC BLOOD PRESSURE: 87 MMHG | OXYGEN SATURATION: 98 %

## 2018-02-19 DIAGNOSIS — J30.9 ALLERGIC RHINITIS, UNSPECIFIED CHRONICITY, UNSPECIFIED SEASONALITY, UNSPECIFIED TRIGGER: ICD-10-CM

## 2018-02-19 DIAGNOSIS — M17.0 OSTEOARTHRITIS OF BOTH KNEES, UNSPECIFIED OSTEOARTHRITIS TYPE: ICD-10-CM

## 2018-02-19 DIAGNOSIS — E55.9 VITAMIN D INSUFFICIENCY: ICD-10-CM

## 2018-02-19 DIAGNOSIS — I10 HTN, GOAL BELOW 130/80: Primary | ICD-10-CM

## 2018-02-19 RX ORDER — AZELASTINE 1 MG/ML
2 SPRAY, METERED NASAL 2 TIMES DAILY
Qty: 1 BOTTLE | Refills: 3 | Status: SHIPPED | OUTPATIENT
Start: 2018-02-19

## 2018-02-19 RX ORDER — ERGOCALCIFEROL 1.25 MG/1
50000 CAPSULE ORAL
Qty: 12 CAP | Refills: 1 | Status: SHIPPED | OUTPATIENT
Start: 2018-02-19 | End: 2018-04-23 | Stop reason: SDUPTHER

## 2018-02-19 NOTE — MR AVS SNAPSHOT
[address] [place] [address] 
[phone] Patient: John Paul Randall MRN: [id number]:[id number] Visit Information Date & Time Provider Department Dept. Phone Encounter #  
 2/19/2018  1:30 PM Ravi Doe NP [facility] [phone] Follow-up Instructions Return in about 6 weeks (around 4/2/2018), or if symptoms worsen or fail to improve, for HTN. Upcoming Health Maintenance Date Due Hepatitis C Screening 1960 LIPID PANEL Q1 1960 EYE EXAM RETINAL OR DILATED Q1 4/22/1970 Pneumococcal 19-64 Medium Risk (1 of 1 - PPSV23) 4/22/1979 DTaP/Tdap/Td series (1 - Tdap) 4/22/1981 FOBT Q 1 YEAR AGE 50-75 4/22/2010 HEMOGLOBIN A1C Q6M 7/22/2018 MICROALBUMIN Q1 9/18/2018 FOOT EXAM Q1 1/22/2019 Allergies as of 2/19/2018  Review Complete On: 2/19/2018 By: Fernando Russell No Known Allergies Current Immunizations  Never Reviewed No immunizations on file. Not reviewed this visit You Were Diagnosed With   
  
 Codes Comments HTN, goal below 130/80    -  Primary ICD-10-CM: I10 
ICD-9-CM: 401.9 Osteoarthritis of both knees, unspecified osteoarthritis type     ICD-10-CM: M17.0 ICD-9-CM: 715.96 Vitamin D insufficiency     ICD-10-CM: E55.9 ICD-9-CM: 268.9 Allergic rhinitis, unspecified chronicity, unspecified seasonality, unspecified trigger     ICD-10-CM: J30.9 ICD-9-CM: 477.9 Vitals BP Pulse Temp Height(growth percentile) Weight(growth percentile) SpO2  
 161/87 (!) 57 97.9 °F (36.6 °C) 5' 11.5\" (1.816 m) 310 lb (140.6 kg) 98% BMI Smoking Status 42.63 kg/m2 Never Smoker Vitals History BMI and BSA Data Body Mass Index Body Surface Area  
 42.63 kg/m 2 2.66 m 2 Preferred Pharmacy Pharmacy Name Phone  [address] [facility] [address] [phone] Your Updated Medication List  
  
   
This list is accurate as of: 18  2:14 PM.  Always use your most recent med list.  
  
  
  
  
 ALLERGY RELIEF (FLUTICASONE) 50 mcg/actuation nasal spray Generic drug:  fluticasone 2 Sprays by Both Nostrils route daily. aspirin delayed-release 81 mg tablet Take 1 Tab by mouth daily. azelastine 137 mcg (0.1 %) nasal spray Commonly known as:  ASTELIN  
2 Sprays by Both Nostrils route two (2) times a day. ergocalciferol 50,000 unit capsule Commonly known as:  ERGOCALCIFEROL Take 1 Cap by mouth every seven (7) days. ibuprofen 600 mg tablet Commonly known as:  MOTRIN  
as needed. lidocaine 5 % Commonly known as:  LIDODERM  
  
 lisinopril 40 mg tablet Commonly known as:  Yolanda Kalata Take 1 Tab by mouth daily. NIFEdipine ER 60 mg ER tablet Commonly known as:  ADALAT CC Take 1 Tab by mouth daily. piroxicam 20 mg capsule Commonly known as:  FELDENE  
20 mg daily. tamsulosin 0.4 mg capsule Commonly known as:  FLOMAX Take 1 Cap by mouth daily. traMADol 50 mg tablet Commonly known as:  ULTRAM  
Take 50 mg by mouth every six (6) hours as needed for Pain. Prescriptions Sent to Pharmacy Refills  
 azelastine (ASTELIN) 137 mcg (0.1 %) nasal spray 3 Si Sprays by Both Nostrils route two (2) times a day. Class: Normal  
 Pharmacy: [address] [place] [address] Ph #: [phone] Route: Both Nostrils  
 ergocalciferol (ERGOCALCIFEROL) 50,000 unit capsule 1 Sig: Take 1 Cap by mouth every seven (7) days. Class: Normal  
 Pharmacy: [address] [place] [address] Ph #: [phone] Route: Oral  
  
Follow-up Instructions Return in about 6 weeks (around 2018), or if symptoms worsen or fail to improve, for HTN. Patient Instructions DASH Diet: Care Instructions Your Care Instructions The DASH diet is an eating plan that can help lower your blood pressure. DASH stands for Dietary Approaches to Stop Hypertension. Hypertension is high blood pressure. The DASH diet focuses on eating foods that are high in calcium, potassium, and magnesium. These nutrients can lower blood pressure. The foods that are highest in these nutrients are fruits, vegetables, low-fat dairy products, nuts, seeds, and legumes. But taking calcium, potassium, and magnesium supplements instead of eating foods that are high in those nutrients does not have the same effect. The DASH diet also includes whole grains, fish, and poultry. The DASH diet is one of several lifestyle changes your doctor may recommend to lower your high blood pressure. Your doctor may also want you to decrease the amount of sodium in your diet. Lowering sodium while following the DASH diet can lower blood pressure even further than just the DASH diet alone. Follow-up care is a key part of your treatment and safety. Be sure to make and go to all appointments, and call your doctor if you are having problems. It's also a good idea to know your test results and keep a list of the medicines you take. How can you care for yourself at home? Following the DASH diet · Eat 4 to 5 servings of fruit each day. A serving is 1 medium-sized piece of fruit, ½ cup chopped or canned fruit, 1/4 cup dried fruit, or 4 ounces (½ cup) of fruit juice. Choose fruit more often than fruit juice. · Eat 4 to 5 servings of vegetables each day. A serving is 1 cup of lettuce or raw leafy vegetables, ½ cup of chopped or cooked vegetables, or 4 ounces (½ cup) of vegetable juice. Choose vegetables more often than vegetable juice. · Get 2 to 3 servings of low-fat and fat-free dairy each day. A serving is 8 ounces of milk, 1 cup of yogurt, or 1 ½ ounces of cheese. · Eat 6 to 8 servings of grains each day. A serving is 1 slice of bread, 1 ounce of dry cereal, or ½ cup of cooked rice, pasta, or cooked cereal. Try to choose whole-grain products as much as possible. · Limit lean meat, poultry, and fish to 2 servings each day. A serving is 3 ounces, about the size of a deck of cards. · Eat 4 to 5 servings of nuts, seeds, and legumes (cooked dried beans, lentils, and split peas) each week. A serving is 1/3 cup of nuts, 2 tablespoons of seeds, or ½ cup of cooked beans or peas. · Limit fats and oils to 2 to 3 servings each day. A serving is 1 teaspoon of vegetable oil or 2 tablespoons of salad dressing. · Limit sweets and added sugars to 5 servings or less a week. A serving is 1 tablespoon jelly or jam, ½ cup sorbet, or 1 cup of lemonade. · Eat less than 2,300 milligrams (mg) of sodium a day. If you limit your sodium to 1,500 mg a day, you can lower your blood pressure even more. Tips for success · Start small. Do not try to make dramatic changes to your diet all at once. You might feel that you are missing out on your favorite foods and then be more likely to not follow the plan. Make small changes, and stick with them. Once those changes become habit, add a few more changes. · Try some of the following: ¨ Make it a goal to eat a fruit or vegetable at every meal and at snacks. This will make it easy to get the recommended amount of fruits and vegetables each day. ¨ Try yogurt topped with fruit and nuts for a snack or healthy dessert. ¨ Add lettuce, tomato, cucumber, and onion to sandwiches. ¨ Combine a ready-made pizza crust with low-fat mozzarella cheese and lots of vegetable toppings. Try using tomatoes, squash, spinach, broccoli, carrots, cauliflower, and onions. ¨ Have a variety of cut-up vegetables with a low-fat dip as an appetizer instead of chips and dip. ¨ Sprinkle sunflower seeds or chopped almonds over salads.  Or try adding chopped walnuts or almonds to cooked vegetables. ¨ Try some vegetarian meals using beans and peas. Add garbanzo or kidney beans to salads. Make burritos and tacos with mashed ca beans or black beans. Where can you learn more? Go to http://melisa-jerome.info/. Enter Y377 in the search box to learn more about \"DASH Diet: Care Instructions. \" Current as of: September 21, 2016 Content Version: 11.4 © 8698-0145 Know[employer]. Care instructions adapted under license by [employer]Pepper (which disclaims liability or warranty for this information). If you have questions about a medical condition or this instruction, always ask your healthcare professional. [address] any warranty or liability for your use of this information. Introducing \[facility]\"" & HEALTH SERVICES! Fernanda Dickerson introduces [employer] patient portal. Now you can access parts of your medical record, email your doctor's office, and request medication refills online. 1. In your internet browser, go to [email or web address] 2. Click on the First Time User? Click Here link in the Sign In box. You will see the New Member Sign Up page. 3. Enter your [employer] Access Code exactly as it appears below. You will not need to use this code after youve completed the sign-up process. If you do not sign up before the expiration date, you must request a new code. · [employer] Access Code: [email or web address] Expires: 4/22/2018  3:24 PM 
 
4. Enter the last four digits of your Social Security Number (xxxx) and Date of Birth (mm/dd/yyyy) as indicated and click Submit. You will be taken to the next sign-up page. 5. Create a [employer]t ID. This will be your [employer] login ID and cannot be changed, so think of one that is secure and easy to remember. 6. Create a [employer] password. You can change your password at any time. 7. Enter your Password Reset Question and Answer.  This can be used at a later time if you forget your password. 8. Enter your e-mail address. You will receive e-mail notification when new information is available in [address] 9. Click Sign Up. You can now view and download portions of your medical record. 10. Click the Download Summary menu link to download a portable copy of your medical information. If you have questions, please visit the Frequently Asked Questions section of the [employer] website. Remember, [employer] is NOT to be used for urgent needs. For medical emergencies, dial 911. Now available from your iPhone and Android! Please provide this summary of care documentation to your next provider. Your primary care clinician is listed as Parish Acevedo. If you have any questions after today's visit, please call [phone].

## 2018-02-19 NOTE — PROGRESS NOTES
HISTORY OF PRESENT ILLNESS  oJhn Paul Bullock is a 62 y.o. male. HPI Comments:  HTN: BP remains elevated. BP is not measured at home. Denies any leg swelling or palpitations. He has been taking his old script of lisinopril 20 mg instead of increasing to 40 mg.    requesting medication refill. Follow-up   The history is provided by the patient. This is a new problem. Hypertension    The history is provided by the patient. This is a chronic problem. The current episode started more than 1 week ago. The problem has not changed since onset. Risk factors include family history, male gender, non-compliant and salty diet. Review of Systems   Constitutional: Negative. HENT: Positive for congestion. Respiratory: Negative. Cardiovascular: Negative. Genitourinary: Negative. Musculoskeletal: Positive for joint pain (both knees). Neurological: Negative. Past Medical History:   Diagnosis Date    Depression     Diabetes ([facility] [address]     Heartburn     Hypertension     Joint pain     Kidney stone     Sinus disorder     Stress      No past surgical history on file. Current Outpatient Prescriptions on File Prior to Visit   Medication Sig Dispense Refill    piroxicam (FELDENE) 20 mg capsule 20 mg daily.  lidocaine (LIDODERM) 5 %       lisinopril (PRINIVIL, ZESTRIL) 40 mg tablet Take 1 Tab by mouth daily. 90 Tab 1    tamsulosin (FLOMAX) 0.4 mg capsule Take 1 Cap by mouth daily. 90 Cap 1    NIFEdipine ER (ADALAT CC) 60 mg ER tablet Take 1 Tab by mouth daily. 90 Tab 1    fluticasone (ALLERGY RELIEF, FLUTICASONE,) 50 mcg/actuation nasal spray 2 Sprays by Both Nostrils route daily.  ibuprofen (MOTRIN) 600 mg tablet as needed.  traMADol (ULTRAM) 50 mg tablet Take 50 mg by mouth every six (6) hours as needed for Pain.  aspirin delayed-release 81 mg tablet Take 1 Tab by mouth daily. 120 Tab 1     No current facility-administered medications on file prior to visit.         Allergies and Intolerances:   No Known Allergies    Family History:   Family History   Problem Relation Age of Onset    Diabetes Mother     Hypertension Father     Cancer Maternal Grandfather     Cancer Paternal Grandfather      pancreatic       Social History:   He  reports that he has never smoked. He has never used smokeless tobacco. He  reports that he drinks alcohol. Vitals:   Visit Vitals    /87    Pulse (!) 57    Temp 97.9 °F (36.6 °C)    Ht 5' 11.5\" (1.816 m)    Wt 310 lb (140.6 kg)    SpO2 98%    BMI 42.63 kg/m2     Body surface area is 2.66 meters squared. Physical Exam   Constitutional: He is oriented to person, place, and time. He appears well-developed and well-nourished. HENT:   Head: Atraumatic. Cardiovascular: Normal rate. Pulmonary/Chest: Effort normal.   Neurological: He is alert and oriented to person, place, and time. Skin: Skin is warm. Psychiatric: He has a normal mood and affect. His behavior is normal.   Nursing note and vitals reviewed. ASSESSMENT and PLAN    ICD-10-CM ICD-9-CM    1. HTN, goal below 130/80 I10 401.9    2. Osteoarthritis of both knees, unspecified osteoarthritis type M17.0 715.96    3. Vitamin D insufficiency E55.9 268.9 ergocalciferol (ERGOCALCIFEROL) 50,000 unit capsule   4. Allergic rhinitis, unspecified chronicity, unspecified seasonality, unspecified trigger J30.9 477.9 azelastine (ASTELIN) 137 mcg (0.1 %) nasal spray     Follow-up Disposition:  Return in about 6 weeks (around 4/2/2018), or if symptoms worsen or fail to improve, for HTN. reviewed medications and side effects in detail  Increase Lisinopril to 40 mg. Take 2 tabs of 20 mg until done then start the new script. - Alarm signals discussed. ER precautions  - Plan of care reviewed with patient. Understanding verbalized and they are in agreement with plan of care.

## 2018-02-19 NOTE — PATIENT INSTRUCTIONS

## 2018-03-05 ENCOUNTER — TELEPHONE (OUTPATIENT)
Dept: FAMILY MEDICINE CLINIC | Facility: CLINIC | Age: 58
End: 2018-03-05

## 2018-03-06 ENCOUNTER — TELEPHONE (OUTPATIENT)
Dept: FAMILY MEDICINE CLINIC | Facility: CLINIC | Age: 58
End: 2018-03-06

## 2018-03-23 DIAGNOSIS — I10 HTN, GOAL BELOW 130/80: ICD-10-CM

## 2018-03-23 NOTE — TELEPHONE ENCOUNTER
Patient's last office visit on 2/19/18  Medication(s) last filled on 9/8/2017  Next Appointment:  4/9/2018  Rx Class Print

## 2018-03-27 RX ORDER — ASPIRIN 81 MG/1
81 TABLET ORAL DAILY
Qty: 120 TAB | Refills: 1 | Status: SHIPPED | OUTPATIENT
Start: 2018-03-27 | End: 2018-04-23 | Stop reason: SDUPTHER

## 2018-04-23 ENCOUNTER — OFFICE VISIT (OUTPATIENT)
Dept: FAMILY MEDICINE CLINIC | Facility: CLINIC | Age: 58
End: 2018-04-23

## 2018-04-23 VITALS
SYSTOLIC BLOOD PRESSURE: 168 MMHG | RESPIRATION RATE: 18 BRPM | HEIGHT: 72 IN | TEMPERATURE: 95.9 F | BODY MASS INDEX: 41.72 KG/M2 | HEART RATE: 71 BPM | WEIGHT: 308 LBS | DIASTOLIC BLOOD PRESSURE: 90 MMHG | OXYGEN SATURATION: 95 %

## 2018-04-23 DIAGNOSIS — R10.9 FLANK PAIN: ICD-10-CM

## 2018-04-23 DIAGNOSIS — E55.9 VITAMIN D INSUFFICIENCY: ICD-10-CM

## 2018-04-23 DIAGNOSIS — E11.9 CONTROLLED TYPE 2 DIABETES MELLITUS WITHOUT COMPLICATION, WITHOUT LONG-TERM CURRENT USE OF INSULIN (HCC): Primary | ICD-10-CM

## 2018-04-23 DIAGNOSIS — J30.9 ALLERGIC RHINITIS, UNSPECIFIED SEASONALITY, UNSPECIFIED TRIGGER: ICD-10-CM

## 2018-04-23 DIAGNOSIS — I10 HTN, GOAL BELOW 130/80: ICD-10-CM

## 2018-04-23 DIAGNOSIS — E66.01 OBESITY, MORBID (HCC): ICD-10-CM

## 2018-04-23 LAB — HBA1C MFR BLD HPLC: 6.7 %

## 2018-04-23 RX ORDER — ERGOCALCIFEROL 1.25 MG/1
50000 CAPSULE ORAL
Qty: 12 CAP | Refills: 2 | Status: SHIPPED | OUTPATIENT
Start: 2018-04-23

## 2018-04-23 RX ORDER — ASPIRIN 81 MG/1
81 TABLET ORAL DAILY
Qty: 120 TAB | Refills: 1 | Status: SHIPPED | OUTPATIENT
Start: 2018-04-23

## 2018-04-23 RX ORDER — FLUTICASONE PROPIONATE 50 MCG
2 SPRAY, SUSPENSION (ML) NASAL DAILY
Qty: 1 BOTTLE | Refills: 5 | Status: SHIPPED | OUTPATIENT
Start: 2018-04-23

## 2018-04-23 RX ORDER — VALSARTAN 160 MG/1
160 TABLET ORAL DAILY
Qty: 90 TAB | Refills: 2 | Status: SHIPPED | OUTPATIENT
Start: 2018-04-23 | End: 2018-05-21 | Stop reason: ALTCHOICE

## 2018-04-23 RX ORDER — NIFEDIPINE 90 MG/1
90 TABLET, FILM COATED, EXTENDED RELEASE ORAL DAILY
Qty: 90 TAB | Refills: 2 | Status: SHIPPED | OUTPATIENT
Start: 2018-04-23 | End: 2019-07-02

## 2018-04-23 NOTE — PROGRESS NOTES
HISTORY OF PRESENT ILLNESS  Alireza Ty is a 62 y.o. male. HPI Comments: HTN: BP remains elevated. He is not compliant with medications all the time. BP is not measured at home. Denies any leg swelling or palpitations. Has not been taking Lisinopril stating he began having flank pain that he relates to the lisinopril. Although he has also had flank pain since stopping the medication. H/o renal calculi, he does not remember passing the stone. DM: does not generally follow a diabetic diet. attempting to loose weight with exercises at home. Compliant with home BG monitoring. Urinary frequency: c/o urinary frequency and nocturia. Not compliant with Flomax. Diabetes   The history is provided by the patient. This is a chronic problem. The current episode started more than 1 week ago. The problem occurs daily. The problem has been rapidly improving. Hypertension    The history is provided by the patient. This is a chronic problem. The current episode started more than 1 week ago. The problem has not changed since onset. Risk factors include obesity, male gender and diabetes mellitus. Review of Systems   HENT: Negative. Eyes: Negative. Respiratory: Negative. Cardiovascular: Negative. Gastrointestinal:        Flank pain     Genitourinary: Negative. Musculoskeletal:        Foot pain   Neurological: Negative. Past Medical History:   Diagnosis Date    Depression     Diabetes ([facility] [address]     Heartburn     Hypertension     Joint pain     Kidney stone     Sinus disorder     Stress      No past surgical history on file. Current Outpatient Prescriptions on File Prior to Visit   Medication Sig Dispense Refill    azelastine (ASTELIN) 137 mcg (0.1 %) nasal spray 2 Sprays by Both Nostrils route two (2) times a day. 1 Bottle 3    piroxicam (FELDENE) 20 mg capsule 20 mg daily.  lidocaine (LIDODERM) 5 % 1 Patch every twelve (12) hours.       tamsulosin (FLOMAX) 0.4 mg capsule Take 1 Cap by mouth daily. 90 Cap 1    ibuprofen (MOTRIN) 600 mg tablet as needed.  traMADol (ULTRAM) 50 mg tablet Take 50 mg by mouth every six (6) hours as needed for Pain. No current facility-administered medications on file prior to visit. Allergies and Intolerances:   No Known Allergies    Family History:   Family History   Problem Relation Age of Onset    Diabetes Mother     Hypertension Father     Cancer Maternal Grandfather     Cancer Paternal Grandfather      pancreatic       Social History:   He  reports that he has never smoked. He has never used smokeless tobacco. He  reports that he drinks alcohol. Vitals:   Visit Vitals    /90    Pulse 71    Temp 95.9 °F (35.5 °C)    Resp 18    Ht 5' 11.5\" (1.816 m)    Wt 308 lb (139.7 kg)    SpO2 95%    BMI 42.36 kg/m2     Body surface area is 2.65 meters squared. Recent Results (from the past 12 hour(s))   AMB POC HEMOGLOBIN A1C    Collection Time: 04/23/18 11:15 AM   Result Value Ref Range    Hemoglobin A1c (POC) 6.7 %       Physical Exam   Constitutional: He is oriented to person, place, and time. He appears well-developed and well-nourished. HENT:   Head: Atraumatic. Cardiovascular: Normal rate and regular rhythm. Pulmonary/Chest: Effort normal and breath sounds normal.   Neurological: He is alert and oriented to person, place, and time. Skin: Skin is warm. Psychiatric: He has a normal mood and affect. His behavior is normal.   Nursing note and vitals reviewed. ASSESSMENT and PLAN    ICD-10-CM ICD-9-CM    1. Controlled type 2 diabetes mellitus without complication, without long-term current use of insulin ([facility]) E11.9 250.00 AMB POC HEMOGLOBIN A1C      LIPID PANEL   2. HTN, goal below 130/80 I10 401.9 NIFEdipine ER (ADALAT CC) 90 mg ER tablet      METABOLIC PANEL, BASIC      aspirin delayed-release 81 mg tablet      valsartan (DIOVAN) 160 mg tablet   3. Flank pain R10.9 789.09 US RETROPERITONEUM COMP   4.  Vitamin D insufficiency E55.9 268.9 ergocalciferol (ERGOCALCIFEROL) 50,000 unit capsule   5. Allergic rhinitis, unspecified seasonality, unspecified trigger J30.9 477.9 fluticasone (ALLERGY RELIEF, FLUTICASONE,) 50 mcg/actuation nasal spray   6. Obesity, morbid ([address] E66.01 278.01      Follow-up Disposition:  Return in about 4 weeks (around 5/21/2018), or if symptoms worsen or fail to improve, for BP check in 4 weeks and OV in 3 months. .  cardiovascular risk and specific lipid/LDL goals reviewed  reviewed medications and side effects in detail  specific diabetic recommendations: home glucose monitoring emphasized, foot care discussed and Podiatry visits discussed, annual eye examinations at Ophthalmology discussed, glycohemoglobin and other lab monitoring discussed and long term diabetic complications discussed  use of aspirin to prevent MI and TIA's discussed    - Alarm signals discussed. ER precautions  - Plan of care reviewed with patient. Understanding verbalized and they are in agreement with plan of care.

## 2018-04-23 NOTE — PATIENT INSTRUCTIONS
High Blood Pressure: Care Instructions  Your Care Instructions    If your blood pressure is usually above 140/90, you have high blood pressure, or hypertension. That means the top number is 140 or higher or the bottom number is 90 or higher, or both. Despite what a lot of people think, high blood pressure usually doesn't cause headaches or make you feel dizzy or lightheaded. It usually has no symptoms. But it does increase your risk for heart attack, stroke, and kidney or eye damage. The higher your blood pressure, the more your risk increases. Your doctor will give you a goal for your blood pressure. Your goal will be based on your health and your age. An example of a goal is to keep your blood pressure below 140/90. Lifestyle changes, such as eating healthy and being active, are always important to help lower blood pressure. You might also take medicine to reach your blood pressure goal.  Follow-up care is a key part of your treatment and safety. Be sure to make and go to all appointments, and call your doctor if you are having problems. It's also a good idea to know your test results and keep a list of the medicines you take. How can you care for yourself at home? Medical treatment  · If you stop taking your medicine, your blood pressure will go back up. You may take one or more types of medicine to lower your blood pressure. Be safe with medicines. Take your medicine exactly as prescribed. Call your doctor if you think you are having a problem with your medicine. · Talk to your doctor before you start taking aspirin every day. Aspirin can help certain people lower their risk of a heart attack or stroke. But taking aspirin isn't right for everyone, because it can cause serious bleeding. · See your doctor regularly. You may need to see the doctor more often at first or until your blood pressure comes down.   · If you are taking blood pressure medicine, talk to your doctor before you take decongestants or anti-inflammatory medicine, such as ibuprofen. Some of these medicines can raise blood pressure. · Learn how to check your blood pressure at home. Lifestyle changes  · Stay at a healthy weight. This is especially important if you put on weight around the waist. Losing even 10 pounds can help you lower your blood pressure. · If your doctor recommends it, get more exercise. Walking is a good choice. Bit by bit, increase the amount you walk every day. Try for at least 30 minutes on most days of the week. You also may want to swim, bike, or do other activities. · Avoid or limit alcohol. Talk to your doctor about whether you can drink any alcohol. · Try to limit how much sodium you eat to less than 2,300 milligrams (mg) a day. Your doctor may ask you to try to eat less than 1,500 mg a day. · Eat plenty of fruits (such as bananas and oranges), vegetables, legumes, whole grains, and low-fat dairy products. · Lower the amount of saturated fat in your diet. Saturated fat is found in animal products such as milk, cheese, and meat. Limiting these foods may help you lose weight and also lower your risk for heart disease. · Do not smoke. Smoking increases your risk for heart attack and stroke. If you need help quitting, talk to your doctor about stop-smoking programs and medicines. These can increase your chances of quitting for good. When should you call for help? Call 911 anytime you think you may need emergency care. This may mean having symptoms that suggest that your blood pressure is causing a serious heart or blood vessel problem. Your blood pressure may be over 180/110. ? For example, call 911 if:  ? · You have symptoms of a heart attack. These may include:  ¨ Chest pain or pressure, or a strange feeling in the chest.  ¨ Sweating. ¨ Shortness of breath. ¨ Nausea or vomiting.   ¨ Pain, pressure, or a strange feeling in the back, neck, jaw, or upper belly or in one or both shoulders or arms.  ¨ Lightheadedness or sudden weakness. ¨ A fast or irregular heartbeat. ? · You have symptoms of a stroke. These may include:  ¨ Sudden numbness, tingling, weakness, or loss of movement in your face, arm, or leg, especially on only one side of your body. ¨ Sudden vision changes. ¨ Sudden trouble speaking. ¨ Sudden confusion or trouble understanding simple statements. ¨ Sudden problems with walking or balance. ¨ A sudden, severe headache that is different from past headaches. ? · You have severe back or belly pain. ?Do not wait until your blood pressure comes down on its own. Get help right away. ?Call your doctor now or seek immediate care if:  ? · Your blood pressure is much higher than normal (such as 180/110 or higher), but you don't have symptoms. ? · You think high blood pressure is causing symptoms, such as:  ¨ Severe headache. ¨ Blurry vision. ? Watch closely for changes in your health, and be sure to contact your doctor if:  ? · Your blood pressure measures 140/90 or higher at least 2 times. That means the top number is 140 or higher or the bottom number is 90 or higher, or both. ? · You think you may be having side effects from your blood pressure medicine. ? · Your blood pressure is usually normal, but it goes above normal at least 2 times. Where can you learn more? Go to http://melisa-jeorme.info/. Enter B809 in the search box to learn more about \"High Blood Pressure: Care Instructions. \"  Current as of: September 21, 2016  Content Version: 11.4  © 6899-2718 [employer]. Care instructions adapted under license by [employer] (which disclaims liability or warranty for this information). If you have questions about a medical condition or this instruction, always ask your healthcare professional. [place] [address] any warranty or liability for your use of this information.

## 2018-04-23 NOTE — MR AVS SNAPSHOT
Siri Kraft 
 
 
 [address] [facility] [address] [place] [address] 
[phone] Patient: Odette Griffiths MRN: [id number] Visit Information Date & Time Provider Department Dept. Phone Encounter #  
 4/23/2018 10:45 AM Gisela Montes NP [facility] [phone] [id number] Follow-up Instructions Return in about 4 weeks (around 5/21/2018), or if symptoms worsen or fail to improve, for BP check in 4 weeks and OV in 3 months. Vandana [place] Upcoming Health Maintenance Date Due Hepatitis C Screening 1960 LIPID PANEL Q1 1960 EYE EXAM RETINAL OR DILATED Q1 4/22/1970 Pneumococcal 19-64 Medium Risk (1 of 1 - PPSV23) 4/22/1979 DTaP/Tdap/Td series (1 - Tdap) 4/22/1981 FOBT Q 1 YEAR AGE 50-75 4/22/2010 HEMOGLOBIN A1C Q6M 7/22/2018 MICROALBUMIN Q1 9/18/2018 FOOT EXAM Q1 1/22/2019 Allergies as of 4/23/2018  Review Complete On: 4/23/2018 By: Jann Woodall No Known Allergies Current Immunizations  Never Reviewed No immunizations on file. Not reviewed this visit You Were Diagnosed With   
  
 Codes Comments Controlled type 2 diabetes mellitus without complication, without long-term current use of insulin ([facility] [address]    -  Primary ICD-10-CM: E11.9 ICD-9-CM: 250.00   
 HTN, goal below 130/80     ICD-10-CM: I10 
ICD-9-CM: 401.9 Flank pain     ICD-10-CM: R10.9 ICD-9-CM: 789.09 Vitamin D insufficiency     ICD-10-CM: E55.9 ICD-9-CM: 268.9 Allergic rhinitis, unspecified seasonality, unspecified trigger     ICD-10-CM: J30.9 ICD-9-CM: 477.9 Vitals BP Pulse Temp Resp Height(growth percentile) Weight(growth percentile)  
 168/90 71 95.9 °F (35.5 °C) 18 5' 11.5\" (1.816 m) 308 lb (139.7 kg) SpO2 BMI Smoking Status 95% 42.36 kg/m2 Never Smoker Vitals History BMI and BSA Data  Body Mass Index Body Surface Area  
 42.36 kg/m 2 2.65 m 2  
  
  
 Preferred Pharmacy Pharmacy Name Phone [address] [place] [address], [facility]. [address] [phone] Your Updated Medication List  
  
   
This list is accurate as of 18 11:39 AM.  Always use your most recent med list.  
  
  
  
  
 aspirin delayed-release 81 mg tablet Take 1 Tab by mouth daily. azelastine 137 mcg (0.1 %) nasal spray Commonly known as:  ASTELIN  
2 Sprays by Both Nostrils route two (2) times a day. ergocalciferol 50,000 unit capsule Commonly known as:  ERGOCALCIFEROL Take 1 Cap by mouth every seven (7) days. fluticasone 50 mcg/actuation nasal spray Commonly known as:  ALLERGY RELIEF (FLUTICASONE) 2 Sprays by Both Nostrils route daily. ibuprofen 600 mg tablet Commonly known as:  MOTRIN  
as needed. lidocaine 5 % Commonly known as:  LIDODERM  
1 Patch every twelve (12) hours. NIFEdipine ER 90 mg ER tablet Commonly known as:  ADALAT CC Take 1 Tab by mouth daily. piroxicam 20 mg capsule Commonly known as:  FELDENE  
20 mg daily. tamsulosin 0.4 mg capsule Commonly known as:  FLOMAX Take 1 Cap by mouth daily. traMADol 50 mg tablet Commonly known as:  ULTRAM  
Take 50 mg by mouth every six (6) hours as needed for Pain.  
  
 valsartan 160 mg tablet Commonly known as:  DIOVAN Take 1 Tab by mouth daily. Prescriptions Printed Refills NIFEdipine ER (ADALAT CC) 90 mg ER tablet 2 Sig: Take 1 Tab by mouth daily. Class: Print Route: Oral  
 aspirin delayed-release 81 mg tablet 1 Sig: Take 1 Tab by mouth daily. Class: Print Route: Oral  
 fluticasone (ALLERGY RELIEF, FLUTICASONE,) 50 mcg/actuation nasal spray 5 Si Sprays by Both Nostrils route daily. Class: Print Route: Both Nostrils  
 ergocalciferol (ERGOCALCIFEROL) 50,000 unit capsule 2 Sig: Take 1 Cap by mouth every seven (7) days. Class: Print  Route: Oral  
 valsartan (DIOVAN) 160 mg tablet 2 Sig: Take 1 Tab by mouth daily. Class: Print Route: Oral  
  
We Performed the Following AMB POC HEMOGLOBIN A1C [92459 CPT(R)] Follow-up Instructions Return in about 4 weeks (around 5/21/2018), or if symptoms worsen or fail to improve, for BP check in 4 weeks and OV in 3 months. .  
  
To-Do List   
 04/23/2018 Lab:  METABOLIC PANEL, BASIC   
  
 04/26/2018 Lab:  LIPID PANEL   
  
 04/30/2018 Imaging:  US RETROPERITONEUM COMP Patient Instructions High Blood Pressure: Care Instructions Your Care Instructions If your blood pressure is usually above 140/90, you have high blood pressure, or hypertension. That means the top number is 140 or higher or the bottom number is 90 or higher, or both. Despite what a lot of people think, high blood pressure usually doesn't cause headaches or make you feel dizzy or lightheaded. It usually has no symptoms. But it does increase your risk for heart attack, stroke, and kidney or eye damage. The higher your blood pressure, the more your risk increases. Your doctor will give you a goal for your blood pressure. Your goal will be based on your health and your age. An example of a goal is to keep your blood pressure below 140/90. Lifestyle changes, such as eating healthy and being active, are always important to help lower blood pressure. You might also take medicine to reach your blood pressure goal. 
Follow-up care is a key part of your treatment and safety. Be sure to make and go to all appointments, and call your doctor if you are having problems. It's also a good idea to know your test results and keep a list of the medicines you take. How can you care for yourself at home? Medical treatment · If you stop taking your medicine, your blood pressure will go back up. You may take one or more types of medicine to lower your blood pressure. Be safe with medicines. Take your medicine exactly as prescribed. Call your doctor if you think you are having a problem with your medicine. · Talk to your doctor before you start taking aspirin every day. Aspirin can help certain people lower their risk of a heart attack or stroke. But taking aspirin isn't right for everyone, because it can cause serious bleeding. · See your doctor regularly. You may need to see the doctor more often at first or until your blood pressure comes down. · If you are taking blood pressure medicine, talk to your doctor before you take decongestants or anti-inflammatory medicine, such as ibuprofen. Some of these medicines can raise blood pressure. · Learn how to check your blood pressure at home. Lifestyle changes · Stay at a healthy weight. This is especially important if you put on weight around the waist. Losing even 10 pounds can help you lower your blood pressure. · If your doctor recommends it, get more exercise. Walking is a good choice. Bit by bit, increase the amount you walk every day. Try for at least 30 minutes on most days of the week. You also may want to swim, bike, or do other activities. · Avoid or limit alcohol. Talk to your doctor about whether you can drink any alcohol. · Try to limit how much sodium you eat to less than 2,300 milligrams (mg) a day. Your doctor may ask you to try to eat less than 1,500 mg a day. · Eat plenty of fruits (such as bananas and oranges), vegetables, legumes, whole grains, and low-fat dairy products. · Lower the amount of saturated fat in your diet. Saturated fat is found in animal products such as milk, cheese, and meat. Limiting these foods may help you lose weight and also lower your risk for heart disease. · Do not smoke. Smoking increases your risk for heart attack and stroke. If you need help quitting, talk to your doctor about stop-smoking programs and medicines. These can increase your chances of quitting for good. When should you call for help? Call 911 anytime you think you may need emergency care. This may mean having symptoms that suggest that your blood pressure is causing a serious heart or blood vessel problem. Your blood pressure may be over 180/110. ? For example, call 911 if: 
? · You have symptoms of a heart attack. These may include: ¨ Chest pain or pressure, or a strange feeling in the chest. 
¨ Sweating. ¨ Shortness of breath. ¨ Nausea or vomiting. ¨ Pain, pressure, or a strange feeling in the back, neck, jaw, or upper belly or in one or both shoulders or arms. ¨ Lightheadedness or sudden weakness. ¨ A fast or irregular heartbeat. ? · You have symptoms of a stroke. These may include: 
¨ Sudden numbness, tingling, weakness, or loss of movement in your face, arm, or leg, especially on only one side of your body. ¨ Sudden vision changes. ¨ Sudden trouble speaking. ¨ Sudden confusion or trouble understanding simple statements. ¨ Sudden problems with walking or balance. ¨ A sudden, severe headache that is different from past headaches. ? · You have severe back or belly pain. ?Do not wait until your blood pressure comes down on its own. Get help right away. ?Call your doctor now or seek immediate care if: 
? · Your blood pressure is much higher than normal (such as 180/110 or higher), but you don't have symptoms. ? · You think high blood pressure is causing symptoms, such as: ¨ Severe headache. ¨ Blurry vision. ? Watch closely for changes in your health, and be sure to contact your doctor if: 
? · Your blood pressure measures 140/90 or higher at least 2 times. That means the top number is 140 or higher or the bottom number is 90 or higher, or both. ? · You think you may be having side effects from your blood pressure medicine. ? · Your blood pressure is usually normal, but it goes above normal at least 2 times. Where can you learn more? Go to http://melisa-jerome.info/. Enter W475 in the search box to learn more about \"High Blood Pressure: Care Instructions. \" Current as of: September 21, 2016 Content Version: 11.4 © 8869-3014 [employer]. Care instructions adapted under license by [employer] (which disclaims liability or warranty for this information). If you have questions about a medical condition or this instruction, always ask your healthcare professional. [address] any warranty or liability for your use of this information. Introducing [facility]! Ann Marie Guevara introduces [employer] patient portal. Now you can access parts of your medical record, email your doctor's office, and request medication refills online. 1. In your internet browser, go to [email or web address] 2. Click on the First Time User? Click Here link in the Sign In box. You will see the New Member Sign Up page. 3. Enter your [employer] Access Code exactly as it appears below. You will not need to use this code after youve completed the sign-up process. If you do not sign up before the expiration date, you must request a new code. · [employer] Access Code: [email or web address] Expires: 7/22/2018 11:39 AM 
 
4. Enter the last four digits of your Social Security Number (xxxx) and Date of Birth (mm/dd/yyyy) as indicated and click Submit. You will be taken to the next sign-up page. 5. Create a [employer] ID. This will be your [employer] login ID and cannot be changed, so think of one that is secure and easy to remember. 6. Create a [employer] password. You can change your password at any time. 7. Enter your Password Reset Question and Answer. This can be used at a later time if you forget your password. 8. Enter your e-mail address. You will receive e-mail notification when new information is available in [address] 9. Click Sign Up.  You can now view and download portions of your medical record. 10. Click the Download Summary menu link to download a portable copy of your medical information. If you have questions, please visit the Frequently Asked Questions section of the [employer] website. Remember, [employer] is NOT to be used for urgent needs. For medical emergencies, dial 911. Now available from your iPhone and Android! Please provide this summary of care documentation to your next provider. Your primary care clinician is listed as Markus Jimenez. If you have any questions after today's visit, please call [phone].

## 2018-04-30 ENCOUNTER — HOSPITAL ENCOUNTER (OUTPATIENT)
Dept: ULTRASOUND IMAGING | Age: 58
Discharge: HOME | End: 2018-04-30
Attending: NURSE PRACTITIONER
Payer: OTHER GOVERNMENT

## 2018-04-30 DIAGNOSIS — R10.9 FLANK PAIN: ICD-10-CM

## 2018-04-30 PROCEDURE — 76770 US EXAM ABDO BACK WALL COMP: CPT

## 2018-05-21 ENCOUNTER — OFFICE VISIT (OUTPATIENT)
Dept: FAMILY MEDICINE CLINIC | Facility: CLINIC | Age: 58
End: 2018-05-21

## 2018-05-21 ENCOUNTER — CLINICAL SUPPORT (OUTPATIENT)
Dept: FAMILY MEDICINE CLINIC | Facility: CLINIC | Age: 58
End: 2018-05-21

## 2018-05-21 VITALS
SYSTOLIC BLOOD PRESSURE: 160 MMHG | TEMPERATURE: 97.6 F | DIASTOLIC BLOOD PRESSURE: 80 MMHG | WEIGHT: 308 LBS | HEIGHT: 72 IN | BODY MASS INDEX: 41.72 KG/M2 | HEART RATE: 72 BPM | RESPIRATION RATE: 16 BRPM | OXYGEN SATURATION: 98 %

## 2018-05-21 VITALS
SYSTOLIC BLOOD PRESSURE: 160 MMHG | DIASTOLIC BLOOD PRESSURE: 80 MMHG | HEART RATE: 72 BPM | RESPIRATION RATE: 16 BRPM | OXYGEN SATURATION: 98 %

## 2018-05-21 DIAGNOSIS — I10 HTN, GOAL BELOW 130/80: Primary | ICD-10-CM

## 2018-05-21 DIAGNOSIS — M72.2 PLANTAR FASCIITIS OF RIGHT FOOT: ICD-10-CM

## 2018-05-21 DIAGNOSIS — N20.0 RENAL CALCULUS, RIGHT: ICD-10-CM

## 2018-05-21 DIAGNOSIS — N28.1 RENAL CYST: ICD-10-CM

## 2018-05-21 RX ORDER — MELOXICAM 15 MG/1
15 TABLET ORAL DAILY
Qty: 90 TAB | Refills: 1 | Status: SHIPPED | OUTPATIENT
Start: 2018-05-21 | End: 2019-07-02

## 2018-05-21 RX ORDER — VALSARTAN 320 MG/1
320 TABLET ORAL DAILY
Qty: 90 TAB | Refills: 1 | Status: SHIPPED | OUTPATIENT
Start: 2018-05-21 | End: 2019-07-02

## 2018-05-21 NOTE — PROGRESS NOTES
HISTORY OF PRESENT ILLNESS  Neeta Rico is a 62 y.o. male. HPI Comments: HTN: BP remains elevated. Reports med compliance. BP is not measured at home. Denies any leg swelling or palpitations. Foot pain: c/o right foot pain for more than 1 week. Does not recall any injury or accident to foot. Denies any swelling. Reports pain when foot is at an angle especially when driving. He has applied an ACE wrap to his foot with minimal relief. Hypertension    The history is provided by the patient. This is a chronic problem. The current episode started more than 1 week ago. The problem has not changed since onset. Risk factors include obesity, male gender and family history. Foot Pain   The history is provided by the patient. This is a recurrent problem. The current episode started more than 1 week ago. The problem occurs constantly. The problem has not changed since onset. Treatments tried: motrin. The treatment provided mild relief. Review of Systems   Constitutional: Negative. HENT: Negative. Respiratory: Negative. Cardiovascular: Negative. Genitourinary: Negative. Musculoskeletal:        Right foot pain     Neurological: Negative. Past Medical History:   Diagnosis Date    Depression     Diabetes ([facility] [address]     Heartburn     Hypertension     Joint pain     Kidney stone     Sinus disorder     Stress      No past surgical history on file. Current Outpatient Prescriptions on File Prior to Visit   Medication Sig Dispense Refill    valsartan (DIOVAN) 320 mg tablet Take 1 Tab by mouth daily. 90 Tab 1    meloxicam (MOBIC) 15 mg tablet Take 1 Tab by mouth daily. 90 Tab 1    NIFEdipine ER (ADALAT CC) 90 mg ER tablet Take 1 Tab by mouth daily. 90 Tab 2    aspirin delayed-release 81 mg tablet Take 1 Tab by mouth daily. 120 Tab 1    fluticasone (ALLERGY RELIEF, FLUTICASONE,) 50 mcg/actuation nasal spray 2 Sprays by Both Nostrils route daily.  1 Bottle 5    ergocalciferol (ERGOCALCIFEROL) 50,000 unit capsule Take 1 Cap by mouth every seven (7) days. 12 Cap 2    azelastine (ASTELIN) 137 mcg (0.1 %) nasal spray 2 Sprays by Both Nostrils route two (2) times a day. 1 Bottle 3    lidocaine (LIDODERM) 5 % 1 Patch every twelve (12) hours.  tamsulosin (FLOMAX) 0.4 mg capsule Take 1 Cap by mouth daily. 90 Cap 1    traMADol (ULTRAM) 50 mg tablet Take 50 mg by mouth every six (6) hours as needed for Pain. No current facility-administered medications on file prior to visit. Allergies and Intolerances:   No Known Allergies    Family History:   Family History   Problem Relation Age of Onset    Diabetes Mother     Hypertension Father     Cancer Maternal Grandfather     Cancer Paternal Grandfather      pancreatic       Social History:   He  reports that he has never smoked. He has never used smokeless tobacco. He  reports that he drinks alcohol. Vitals:   Visit Vitals    /80 (BP 1 Location: Left arm, BP Patient Position: Sitting)  Comment: manual    Pulse 72    Temp 97.6 °F (36.4 °C)    Resp 16    Ht 5' 11.5\" (1.816 m)    Wt 308 lb (139.7 kg)    SpO2 98%    BMI 42.36 kg/m2     Body surface area is 2.65 meters squared. US retroperitoneum comp 04/30/18  IMPRESSION  Impression:   1. Right renal calculus measuring up to 1 cm. No hydronephrosis. 2.  Bilateral simple renal cysts. 3.  Distended bladder with 224 mL fluid estimated. Physical Exam   Constitutional: He is oriented to person, place, and time. He appears well-developed and well-nourished. Cardiovascular: Normal rate. Pulmonary/Chest: Effort normal.   Neurological: He is alert and oriented to person, place, and time. Psychiatric: He has a normal mood and affect. His behavior is normal.   Nursing note and vitals reviewed. ASSESSMENT and PLAN    ICD-10-CM ICD-9-CM    1. HTN, goal below 130/80 I10 401.9    2. Renal cyst N28.1 753.10    3. Plantar fasciitis of right foot M72.2 728.71    4.  Renal calculus, right N20.0 592.0      Follow-up Disposition:  Return if symptoms worsen or fail to improve, for keep already scheduled appt. .  cardiovascular risk and specific lipid/LDL goals reviewed  reviewed medications and side effects in detail  radiology results and schedule of future radiology studies reviewed with patient  Increase valsartan to 320 mg daily. meloxicam 15 mg daily for foot pain. Refer to podiatry. May wear insoles daily. Refer to nephrology for renal cyst.     - Alarm signals discussed. ER precautions  - Plan of care reviewed with patient. Understanding verbalized and they are in agreement with plan of care.

## 2018-05-21 NOTE — MR AVS SNAPSHOT
[facility] 
 
 
 [address] [facility] [address] [place] [address] 
[phone] Patient: Molly Stewart MRN: [id number] Visit Information Date & Time Provider Department Dept. Phone Encounter #  
 5/21/2018  9:15 AM Gabrielle Bradshaw NP [employer] [phone] Your Appointments 7/23/2018  9:45 AM  
ROUTINE CARE with Gabrielle Bradshaw NP [facility] Main Office ([facility]) Appt Note: 3 month follow up appt. [address] [facility] [address] [facility] [address]  
[phone]  
  
   
 [address] [place] [address] Rona Fentress Upcoming Health Maintenance Date Due Hepatitis C Screening 1960 LIPID PANEL Q1 1960 EYE EXAM RETINAL OR DILATED Q1 4/22/1970 Pneumococcal 19-64 Medium Risk (1 of 1 - PPSV23) 4/22/1979 DTaP/Tdap/Td series (1 - Tdap) 4/22/1981 FOBT Q 1 YEAR AGE 50-75 4/22/2010 Influenza Age 5 to Adult 8/1/2018 MICROALBUMIN Q1 9/18/2018 HEMOGLOBIN A1C Q6M 10/23/2018 FOOT EXAM Q1 1/22/2019 Allergies as of 5/21/2018  Review Complete On: 4/23/2018 By: Gabrielle Bradshaw NP No Known Allergies Current Immunizations  Never Reviewed No immunizations on file. Not reviewed this visit You Were Diagnosed With   
  
 Codes Comments HTN, goal below 130/80    -  Primary ICD-10-CM: I10 
ICD-9-CM: 401.9 Renal cyst     ICD-10-CM: N28.1 ICD-9-CM: 753.10 Plantar fasciitis of right foot     ICD-10-CM: M72.2 ICD-9-CM: 728.71 Vitals BP Pulse Resp SpO2 Smoking Status 160/80 (BP 1 Location: Left arm, BP Patient Position: Sitting) 72 16 98% Never Smoker Preferred Pharmacy Pharmacy Name Phone [address], [address] [place] [address] Road [phone] Your Updated Medication List  
  
   
This list is accurate as of 5/21/18  9:57 AM.  Always use your most recent med list.  
  
  
  
  
 aspirin delayed-release 81 mg tablet Take 1 Tab by mouth daily. azelastine 137 mcg (0.1 %) nasal spray Commonly known as:  ASTELIN  
2 Sprays by Both Nostrils route two (2) times a day. ergocalciferol 50,000 unit capsule Commonly known as:  ERGOCALCIFEROL Take 1 Cap by mouth every seven (7) days. fluticasone 50 mcg/actuation nasal spray Commonly known as:  ALLERGY RELIEF (FLUTICASONE) 2 Sprays by Both Nostrils route daily. lidocaine 5 % Commonly known as:  LIDODERM  
1 Patch every twelve (12) hours. meloxicam 15 mg tablet Commonly known as:  MOBIC Take 1 Tab by mouth daily. NIFEdipine ER 90 mg ER tablet Commonly known as:  ADALAT CC Take 1 Tab by mouth daily. tamsulosin 0.4 mg capsule Commonly known as:  FLOMAX Take 1 Cap by mouth daily. traMADol 50 mg tablet Commonly known as:  ULTRAM  
Take 50 mg by mouth every six (6) hours as needed for Pain.  
  
 valsartan 320 mg tablet Commonly known as:  DIOVAN Take 1 Tab by mouth daily. Prescriptions Sent to Pharmacy Refills  
 valsartan (DIOVAN) 320 mg tablet 1 Sig: Take 1 Tab by mouth daily. Class: Normal  
 Pharmacy: [address] [place] [address] Ph #: [phone] Route: Oral  
 meloxicam (MOBIC) 15 mg tablet 1 Sig: Take 1 Tab by mouth daily. Class: Normal  
 Pharmacy: [address] [place] [address] Ph #: [phone] Route: Oral  
  
We Performed the Following REFERRAL TO NEPHROLOGY [HKC43 Custom] REFERRAL TO PODIATRY [REF90 Custom] Referral Information Referral ID Referred By Referred To  
  
 [id number] MD Jaun Mendez-Nevermann-Platz  [address] [place] [address] Street Phone: [phone] Fax: [phone] Visits Status Start Date End Date 1 New Request 5/21/18 5/21/19 If your referral has a status of pending review or denied, additional information will be sent to support the outcome of this decision. Referral ID Referred By Referred To  
 [id number] ELIDA, Igor [address], DPM Ruwilli Garcia [address], [address] Phone: [phone] Fax: [phone] Visits Status Start Date End Date 1 New Request 5/21/18 5/21/19 If your referral has a status of pending review or denied, additional information will be sent to support the outcome of this decision. Patient Instructions DASH Diet: Care Instructions Your Care Instructions The DASH diet is an eating plan that can help lower your blood pressure. DASH stands for Dietary Approaches to Stop Hypertension. Hypertension is high blood pressure. The DASH diet focuses on eating foods that are high in calcium, potassium, and magnesium. These nutrients can lower blood pressure. The foods that are highest in these nutrients are fruits, vegetables, low-fat dairy products, nuts, seeds, and legumes. But taking calcium, potassium, and magnesium supplements instead of eating foods that are high in those nutrients does not have the same effect. The DASH diet also includes whole grains, fish, and poultry. The DASH diet is one of several lifestyle changes your doctor may recommend to lower your high blood pressure. Your doctor may also want you to decrease the amount of sodium in your diet. Lowering sodium while following the DASH diet can lower blood pressure even further than just the DASH diet alone. Follow-up care is a key part of your treatment and safety. Be sure to make and go to all appointments, and call your doctor if you are having problems. It's also a good idea to know your test results and keep a list of the medicines you take. How can you care for yourself at home? Following the DASH diet · Eat 4 to 5 servings of fruit each day.  A serving is 1 medium-sized piece of fruit, ½ cup chopped or canned fruit, 1/4 cup dried fruit, or 4 ounces (½ cup) of fruit juice. Choose fruit more often than fruit juice. · Eat 4 to 5 servings of vegetables each day. A serving is 1 cup of lettuce or raw leafy vegetables, ½ cup of chopped or cooked vegetables, or 4 ounces (½ cup) of vegetable juice. Choose vegetables more often than vegetable juice. · Get 2 to 3 servings of low-fat and fat-free dairy each day. A serving is 8 ounces of milk, 1 cup of yogurt, or 1 ½ ounces of cheese. · Eat 6 to 8 servings of grains each day. A serving is 1 slice of bread, 1 ounce of dry cereal, or ½ cup of cooked rice, pasta, or cooked cereal. Try to choose whole-grain products as much as possible. · Limit lean meat, poultry, and fish to 2 servings each day. A serving is 3 ounces, about the size of a deck of cards. · Eat 4 to 5 servings of nuts, seeds, and legumes (cooked dried beans, lentils, and split peas) each week. A serving is 1/3 cup of nuts, 2 tablespoons of seeds, or ½ cup of cooked beans or peas. · Limit fats and oils to 2 to 3 servings each day. A serving is 1 teaspoon of vegetable oil or 2 tablespoons of salad dressing. · Limit sweets and added sugars to 5 servings or less a week. A serving is 1 tablespoon jelly or jam, ½ cup sorbet, or 1 cup of lemonade. · Eat less than 2,300 milligrams (mg) of sodium a day. If you limit your sodium to 1,500 mg a day, you can lower your blood pressure even more. Tips for success · Start small. Do not try to make dramatic changes to your diet all at once. You might feel that you are missing out on your favorite foods and then be more likely to not follow the plan. Make small changes, and stick with them. Once those changes become habit, add a few more changes. · Try some of the following: ¨ Make it a goal to eat a fruit or vegetable at every meal and at snacks. This will make it easy to get the recommended amount of fruits and vegetables each day. ¨ Try yogurt topped with fruit and nuts for a snack or healthy dessert. ¨ Add lettuce, tomato, cucumber, and onion to sandwiches. ¨ Combine a ready-made pizza crust with low-fat mozzarella cheese and lots of vegetable toppings. Try using tomatoes, squash, spinach, broccoli, carrots, cauliflower, and onions. ¨ Have a variety of cut-up vegetables with a low-fat dip as an appetizer instead of chips and dip. ¨ Sprinkle sunflower seeds or chopped almonds over salads. Or try adding chopped walnuts or almonds to cooked vegetables. ¨ Try some vegetarian meals using beans and peas. Add garbanzo or kidney beans to salads. Make burritos and tacos with mashed ca beans or black beans. Where can you learn more? Go to http://melisaVivojerome.info/. Enter X550 in the search box to learn more about \"DASH Diet: Care Instructions. \" Current as of: September 21, 2016 Content Version: 11.4 © 2480-8691 [employer]. Care instructions adapted under license by [employer] (which disclaims liability or warranty for this information). If you have questions about a medical condition or this instruction, always ask your healthcare professional. [place] [address] any warranty or liability for your use of this information. Introducing [facility]! [address] introduces [employer] patient portal. Now you can access parts of your medical record, email your doctor's office, and request medication refills online. 1. In your internet browser, go to [email or web address]. [employer] 2. Click on the First Time User? Click Here link in the Sign In box. You will see the New Member Sign Up page. 3. Enter your [employer] Access Code exactly as it appears below. You will not need to use this code after youve completed the sign-up process. If you do not sign up before the expiration date, you must request a new code. · [employer] Access Code: [email or web address] Expires: 7/22/2018 11:39 AM 
 
4. Enter the last four digits of your Social Security Number (xxxx) and Date of Birth (mm/dd/yyyy) as indicated and click Submit. You will be taken to the next sign-up page. 5. Create a [employer] ID. This will be your [employer] login ID and cannot be changed, so think of one that is secure and easy to remember. 6. Create a [employer] password. You can change your password at any time. 7. Enter your Password Reset Question and Answer. This can be used at a later time if you forget your password. 8. Enter your e-mail address. You will receive e-mail notification when new information is available in [address] 9. Click Sign Up. You can now view and download portions of your medical record. 10. Click the Download Summary menu link to download a portable copy of your medical information. If you have questions, please visit the Frequently Asked Questions section of the [employer] website. Remember, [employer] is NOT to be used for urgent needs. For medical emergencies, dial 911. Now available from your iPhone and Android! Please provide this summary of care documentation to your next provider. Your primary care clinician is listed as Keven Willis. If you have any questions after today's visit, please call [phone].

## 2018-05-21 NOTE — PATIENT INSTRUCTIONS

## 2018-05-21 NOTE — PROGRESS NOTES
Nurse Visit - Hypertenstion  Santhosh Martinez is a 62 y.o. male who is here for a follow up Blood Pressure    Vision changes: None. Headache: None. Has patients missed any doses of medicine?   no      No Known Allergies    Current Outpatient Prescriptions   Medication Sig Dispense Refill    NIFEdipine ER (ADALAT CC) 90 mg ER tablet Take 1 Tab by mouth daily. 90 Tab 2    aspirin delayed-release 81 mg tablet Take 1 Tab by mouth daily. 120 Tab 1    fluticasone (ALLERGY RELIEF, FLUTICASONE,) 50 mcg/actuation nasal spray 2 Sprays by Both Nostrils route daily. 1 Bottle 5    ergocalciferol (ERGOCALCIFEROL) 50,000 unit capsule Take 1 Cap by mouth every seven (7) days. 12 Cap 2    valsartan (DIOVAN) 160 mg tablet Take 1 Tab by mouth daily. 90 Tab 2    azelastine (ASTELIN) 137 mcg (0.1 %) nasal spray 2 Sprays by Both Nostrils route two (2) times a day. 1 Bottle 3    piroxicam (FELDENE) 20 mg capsule 20 mg daily.  lidocaine (LIDODERM) 5 % 1 Patch every twelve (12) hours.  tamsulosin (FLOMAX) 0.4 mg capsule Take 1 Cap by mouth daily. 90 Cap 1    ibuprofen (MOTRIN) 600 mg tablet as needed.  traMADol (ULTRAM) 50 mg tablet Take 50 mg by mouth every six (6) hours as needed for Pain. BP Readings from Last 4 Encounters:   04/23/18 168/90   02/19/18 161/87   01/22/18 (!) 162/102   09/18/17 152/80       There were no vitals taken for this visit. Recommendations:   -DASH diet   -150 min exercise / week   -Keep BMI at 20 - 25       Patient informed that if any changes are made by PCP, then he/she will receive a call. Patient verbalized an understanding and did not voice any concerns at this time.

## 2018-06-07 ENCOUNTER — TELEPHONE (OUTPATIENT)
Dept: FAMILY MEDICINE CLINIC | Facility: CLINIC | Age: 58
End: 2018-06-07

## 2018-06-07 NOTE — TELEPHONE ENCOUNTER
Patient came into office stating that he need a referral to see podiatrist. He prefers to go to [facility] [phone]. He stated that he has been talking to James Zhao. Patient is requesting a call back as soon as referral is sent over.

## 2019-07-02 ENCOUNTER — HOSPITAL ENCOUNTER (EMERGENCY)
Age: 59
Discharge: HOME | End: 2019-07-02
Attending: EMERGENCY MEDICINE | Admitting: EMERGENCY MEDICINE
Payer: OTHER GOVERNMENT

## 2019-07-02 ENCOUNTER — APPOINTMENT (OUTPATIENT)
Dept: CT IMAGING | Age: 59
End: 2019-07-02
Attending: EMERGENCY MEDICINE
Payer: OTHER GOVERNMENT

## 2019-07-02 VITALS
OXYGEN SATURATION: 99 % | BODY MASS INDEX: 40.09 KG/M2 | HEART RATE: 58 BPM | HEIGHT: 72 IN | WEIGHT: 296 LBS | DIASTOLIC BLOOD PRESSURE: 86 MMHG | TEMPERATURE: 98.4 F | RESPIRATION RATE: 18 BRPM | SYSTOLIC BLOOD PRESSURE: 141 MMHG

## 2019-07-02 DIAGNOSIS — N13.2 URETERAL STONE WITH HYDRONEPHROSIS: Primary | ICD-10-CM

## 2019-07-02 DIAGNOSIS — E86.0 DEHYDRATION: ICD-10-CM

## 2019-07-02 DIAGNOSIS — N20.0 KIDNEY STONE: ICD-10-CM

## 2019-07-02 LAB
ALBUMIN SERPL-MCNC: 3.7 G/DL (ref 3.4–5)
ALBUMIN/GLOB SERPL: 0.8 {RATIO} (ref 0.8–1.7)
ALP SERPL-CCNC: 83 U/L (ref 45–117)
ALT SERPL-CCNC: 37 U/L (ref 16–61)
ANION GAP SERPL CALC-SCNC: 9 MMOL/L (ref 3–18)
APPEARANCE UR: CLEAR
AST SERPL-CCNC: 13 U/L (ref 15–37)
ATRIAL RATE: 64 BPM
BASOPHILS # BLD: 0 K/UL (ref 0–0.1)
BASOPHILS NFR BLD: 0 % (ref 0–2)
BILIRUB SERPL-MCNC: 0.6 MG/DL (ref 0.2–1)
BILIRUB UR QL: NEGATIVE
BUN SERPL-MCNC: 24 MG/DL (ref 7–18)
BUN/CREAT SERPL: 17 (ref 12–20)
CALCIUM SERPL-MCNC: 9.4 MG/DL (ref 8.5–10.1)
CALCULATED P AXIS, ECG09: 59 DEGREES
CALCULATED R AXIS, ECG10: 26 DEGREES
CALCULATED T AXIS, ECG11: 41 DEGREES
CHLORIDE SERPL-SCNC: 103 MMOL/L (ref 100–108)
CO2 SERPL-SCNC: 26 MMOL/L (ref 21–32)
COLOR UR: YELLOW
CREAT SERPL-MCNC: 1.45 MG/DL (ref 0.6–1.3)
DIAGNOSIS, 93000: NORMAL
DIFFERENTIAL METHOD BLD: ABNORMAL
EOSINOPHIL # BLD: 0 K/UL (ref 0–0.4)
EOSINOPHIL NFR BLD: 0 % (ref 0–5)
EPITH CASTS URNS QL MICRO: NORMAL /LPF (ref 0–5)
ERYTHROCYTE [DISTWIDTH] IN BLOOD BY AUTOMATED COUNT: 14.7 % (ref 11.6–14.5)
GLOBULIN SER CALC-MCNC: 4.5 G/DL (ref 2–4)
GLUCOSE SERPL-MCNC: 111 MG/DL (ref 74–99)
GLUCOSE UR STRIP.AUTO-MCNC: NEGATIVE MG/DL
GRAN CASTS URNS QL MICRO: NORMAL /LPF
HCT VFR BLD AUTO: 43.9 % (ref 36–48)
HGB BLD-MCNC: 15.3 G/DL (ref 13–16)
HGB UR QL STRIP: ABNORMAL
HYALINE CASTS URNS QL MICRO: NORMAL /LPF (ref 0–2)
KETONES UR QL STRIP.AUTO: NEGATIVE MG/DL
LEUKOCYTE ESTERASE UR QL STRIP.AUTO: ABNORMAL
LIPASE SERPL-CCNC: 77 U/L (ref 73–393)
LYMPHOCYTES # BLD: 1.1 K/UL (ref 0.9–3.6)
LYMPHOCYTES NFR BLD: 11 % (ref 21–52)
MCH RBC QN AUTO: 29.3 PG (ref 24–34)
MCHC RBC AUTO-ENTMCNC: 34.9 G/DL (ref 31–37)
MCV RBC AUTO: 83.9 FL (ref 74–97)
MONOCYTES # BLD: 0.9 K/UL (ref 0.05–1.2)
MONOCYTES NFR BLD: 9 % (ref 3–10)
NEUTS SEG # BLD: 8.1 K/UL (ref 1.8–8)
NEUTS SEG NFR BLD: 80 % (ref 40–73)
NITRITE UR QL STRIP.AUTO: NEGATIVE
P-R INTERVAL, ECG05: 176 MS
PH BLDV: 7.42 [PH] (ref 7.32–7.42)
PH UR STRIP: 5.5 [PH] (ref 5–8)
PLATELET # BLD AUTO: 260 K/UL (ref 135–420)
PMV BLD AUTO: 10.3 FL (ref 9.2–11.8)
POTASSIUM SERPL-SCNC: 4.2 MMOL/L (ref 3.5–5.5)
PROT SERPL-MCNC: 8.2 G/DL (ref 6.4–8.2)
PROT UR STRIP-MCNC: NEGATIVE MG/DL
Q-T INTERVAL, ECG07: 402 MS
QRS DURATION, ECG06: 102 MS
QTC CALCULATION (BEZET), ECG08: 414 MS
RBC # BLD AUTO: 5.23 M/UL (ref 4.7–5.5)
RBC #/AREA URNS HPF: NORMAL /HPF (ref 0–5)
SALICYLATES SERPL-MCNC: <1.7 MG/DL (ref 2.8–20)
SODIUM SERPL-SCNC: 138 MMOL/L (ref 136–145)
SP GR UR REFRACTOMETRY: 1.02 (ref 1–1.03)
TROPONIN I SERPL-MCNC: 0.04 NG/ML (ref 0–0.04)
UROBILINOGEN UR QL STRIP.AUTO: 1 EU/DL (ref 0.2–1)
VENTRICULAR RATE, ECG03: 64 BPM
WBC # BLD AUTO: 10.1 K/UL (ref 4.6–13.2)
WBC URNS QL MICRO: NORMAL /HPF (ref 0–4)

## 2019-07-02 PROCEDURE — 74011000250 HC RX REV CODE- 250: Performed by: EMERGENCY MEDICINE

## 2019-07-02 PROCEDURE — 96376 TX/PRO/DX INJ SAME DRUG ADON: CPT

## 2019-07-02 PROCEDURE — L3650 SO 8 ABD RESTRAINT PRE OTS: HCPCS

## 2019-07-02 PROCEDURE — 80053 COMPREHEN METABOLIC PANEL: CPT

## 2019-07-02 PROCEDURE — 77030002974 HC SUT PLN J&J -A

## 2019-07-02 PROCEDURE — 82800 BLOOD PH: CPT

## 2019-07-02 PROCEDURE — 99285 EMERGENCY DEPT VISIT HI MDM: CPT

## 2019-07-02 PROCEDURE — 96375 TX/PRO/DX INJ NEW DRUG ADDON: CPT

## 2019-07-02 PROCEDURE — 77030002888 HC SUT CHRMC J&J -A

## 2019-07-02 PROCEDURE — 93005 ELECTROCARDIOGRAM TRACING: CPT

## 2019-07-02 PROCEDURE — 85025 COMPLETE CBC W/AUTO DIFF WBC: CPT

## 2019-07-02 PROCEDURE — 96374 THER/PROPH/DIAG INJ IV PUSH: CPT

## 2019-07-02 PROCEDURE — 96361 HYDRATE IV INFUSION ADD-ON: CPT

## 2019-07-02 PROCEDURE — 81001 URINALYSIS AUTO W/SCOPE: CPT

## 2019-07-02 PROCEDURE — 80307 DRUG TEST PRSMV CHEM ANLYZR: CPT

## 2019-07-02 PROCEDURE — 83690 ASSAY OF LIPASE: CPT

## 2019-07-02 PROCEDURE — 74011250636 HC RX REV CODE- 250/636: Performed by: EMERGENCY MEDICINE

## 2019-07-02 PROCEDURE — 77030002996 HC SUT SLK J&J -A

## 2019-07-02 PROCEDURE — 74176 CT ABD & PELVIS W/O CONTRAST: CPT

## 2019-07-02 PROCEDURE — L0172 CERV COL SR FOAM 2PC PRE OTS: HCPCS

## 2019-07-02 PROCEDURE — 84484 ASSAY OF TROPONIN QUANT: CPT

## 2019-07-02 RX ORDER — CEPHALEXIN 500 MG/1
500 CAPSULE ORAL 3 TIMES DAILY
Qty: 21 CAP | Refills: 0 | Status: SHIPPED | OUTPATIENT
Start: 2019-07-02 | End: 2019-07-09

## 2019-07-02 RX ORDER — MORPHINE SULFATE 2 MG/ML
4 INJECTION, SOLUTION INTRAMUSCULAR; INTRAVENOUS
Status: COMPLETED | OUTPATIENT
Start: 2019-07-02 | End: 2019-07-02

## 2019-07-02 RX ORDER — NAPROXEN 375 MG/1
375 TABLET ORAL 2 TIMES DAILY WITH MEALS
Qty: 10 TAB | Refills: 0 | Status: SHIPPED | OUTPATIENT
Start: 2019-07-02 | End: 2019-07-07

## 2019-07-02 RX ORDER — ONDANSETRON 2 MG/ML
4 INJECTION INTRAMUSCULAR; INTRAVENOUS
Status: COMPLETED | OUTPATIENT
Start: 2019-07-02 | End: 2019-07-02

## 2019-07-02 RX ORDER — LOSARTAN POTASSIUM 25 MG/1
TABLET ORAL DAILY
COMMUNITY
End: 2019-07-02

## 2019-07-02 RX ORDER — KETOROLAC TROMETHAMINE 30 MG/ML
15 INJECTION, SOLUTION INTRAMUSCULAR; INTRAVENOUS
Status: COMPLETED | OUTPATIENT
Start: 2019-07-02 | End: 2019-07-02

## 2019-07-02 RX ORDER — AMLODIPINE AND OLMESARTAN MEDOXOMIL 10; 40 MG/1; MG/1
TABLET ORAL
COMMUNITY
End: 2019-07-05

## 2019-07-02 RX ORDER — HYDROCODONE BITARTRATE AND ACETAMINOPHEN 5; 325 MG/1; MG/1
1 TABLET ORAL
Qty: 4 TAB | Refills: 0 | Status: SHIPPED | OUTPATIENT
Start: 2019-07-02 | End: 2019-07-05 | Stop reason: SDUPTHER

## 2019-07-02 RX ORDER — ONDANSETRON 8 MG/1
8 TABLET, ORALLY DISINTEGRATING ORAL
Qty: 12 TAB | Refills: 0 | Status: SHIPPED | OUTPATIENT
Start: 2019-07-02

## 2019-07-02 RX ORDER — SODIUM CHLORIDE 9 MG/ML
125 INJECTION, SOLUTION INTRAVENOUS CONTINUOUS
Status: DISCONTINUED | OUTPATIENT
Start: 2019-07-02 | End: 2019-07-02 | Stop reason: HOSPADM

## 2019-07-02 RX ORDER — FAMOTIDINE 10 MG/ML
20 INJECTION INTRAVENOUS
Status: DISCONTINUED | OUTPATIENT
Start: 2019-07-02 | End: 2019-07-02

## 2019-07-02 RX ORDER — SODIUM CHLORIDE 9 MG/ML
150 INJECTION, SOLUTION INTRAVENOUS CONTINUOUS
Status: DISCONTINUED | OUTPATIENT
Start: 2019-07-02 | End: 2019-07-02 | Stop reason: HOSPADM

## 2019-07-02 RX ADMIN — MORPHINE SULFATE 4 MG: 2 INJECTION, SOLUTION INTRAMUSCULAR; INTRAVENOUS at 10:58

## 2019-07-02 RX ADMIN — SODIUM CHLORIDE 1000 ML: 900 INJECTION, SOLUTION INTRAVENOUS at 08:58

## 2019-07-02 RX ADMIN — SODIUM CHLORIDE 125 ML/HR: 900 INJECTION, SOLUTION INTRAVENOUS at 08:58

## 2019-07-02 RX ADMIN — SODIUM CHLORIDE 1000 ML: 900 INJECTION, SOLUTION INTRAVENOUS at 11:45

## 2019-07-02 RX ADMIN — SODIUM CHLORIDE 1000 ML: 900 INJECTION, SOLUTION INTRAVENOUS at 09:57

## 2019-07-02 RX ADMIN — FAMOTIDINE 20 MG: 10 INJECTION INTRAVENOUS at 08:58

## 2019-07-02 RX ADMIN — KETOROLAC TROMETHAMINE 15 MG: 30 INJECTION, SOLUTION INTRAMUSCULAR; INTRAVENOUS at 11:45

## 2019-07-02 RX ADMIN — ONDANSETRON HYDROCHLORIDE 4 MG: 2 SOLUTION INTRAMUSCULAR; INTRAVENOUS at 08:58

## 2019-07-02 RX ADMIN — MORPHINE SULFATE 4 MG: 2 INJECTION, SOLUTION INTRAMUSCULAR; INTRAVENOUS at 08:58

## 2019-07-02 NOTE — DISCHARGE INSTRUCTIONS
Kidney Stone: Care Instructions  Your Care Instructions    Kidney stones are formed when salts, minerals, and other substances normally found in the urine clump together. They can be as small as grains of sand or, rarely, as large as golf balls. While the stone is traveling through the ureter, which is the tube that carries urine from the kidney to the bladder, you will probably feel pain. The pain may be mild or very severe. You may also have some blood in your urine. As soon as the stone reaches the bladder, any intense pain should go away. If a stone is too large to pass on its own, you may need a medical procedure to help you pass the stone. The doctor has checked you carefully, but problems can develop later. If you notice any problems or new symptoms, get medical treatment right away. Follow-up care is a key part of your treatment and safety. Be sure to make and go to all appointments, and call your doctor if you are having problems. It's also a good idea to know your test results and keep a list of the medicines you take. How can you care for yourself at home? · Drink plenty of fluids, enough so that your urine is light yellow or clear like water. If you have kidney, heart, or liver disease and have to limit fluids, talk with your doctor before you increase the amount of fluids you drink. · Take pain medicines exactly as directed. Call your doctor if you think you are having a problem with your medicine. ? If the doctor gave you a prescription medicine for pain, take it as prescribed. ? If you are not taking a prescription pain medicine, ask your doctor if you can take an over-the-counter medicine. Read and follow all instructions on the label. · Your doctor may ask you to strain your urine so that you can collect your kidney stone when it passes. You can use a kitchen strainer or a tea strainer to catch the stone. Store it in a plastic bag until you see your doctor again.   Preventing future kidney stones  Some changes in your diet may help prevent kidney stones. Depending on the cause of your stones, your doctor may recommend that you:  · Drink plenty of fluids, enough so that your urine is light yellow or clear like water. If you have kidney, heart, or liver disease and have to limit fluids, talk with your doctor before you increase the amount of fluids you drink. · Limit coffee, tea, and alcohol. Also avoid grapefruit juice. · Do not take more than the recommended daily dose of vitamins C and D.  · Avoid antacids such as Gaviscon, Maalox, Mylanta, or Tums. · Limit the amount of salt (sodium) in your diet. · Eat a balanced diet that is not too high in protein. · Limit foods that are high in a substance called oxalate, which can cause kidney stones. These foods include dark green vegetables, rhubarb, chocolate, wheat bran, nuts, cranberries, and beans. When should you call for help? Call your doctor now or seek immediate medical care if:    · You cannot keep down fluids.     · Your pain gets worse.     · You have a fever or chills.     · You have new or worse pain in your back just below your rib cage (the flank area).     · You have new or more blood in your urine.    Watch closely for changes in your health, and be sure to contact your doctor if:    · You do not get better as expected. Where can you learn more? Go to http://melisa-jerome.info/. Enter Q667 in the search box to learn more about \"Kidney Stone: Care Instructions. \"  Current as of: March 14, 2018  Content Version: 11.9  © 7161-9481 [employer]. Care instructions adapted under license by [employer] (which disclaims liability or warranty for this information). If you have questions about a medical condition or this instruction, always ask your healthcare professional. [address] any warranty or liability for your use of this information.     Patient Education Dehydration: Care Instructions  Your Care Instructions  Dehydration happens when your body loses too much fluid. This might happen when you do not drink enough water or you lose large amounts of fluids from your body because of diarrhea, vomiting, or sweating. Severe dehydration can be life-threatening. Water and minerals called electrolytes help put your body fluids back in balance. Learn the early signs of fluid loss, and drink more fluids to prevent dehydration. Follow-up care is a key part of your treatment and safety. Be sure to make and go to all appointments, and call your doctor if you are having problems. It's also a good idea to know your test results and keep a list of the medicines you take. How can you care for yourself at home? · To prevent dehydration, drink plenty of fluids, enough so that your urine is light yellow or clear like water. Choose water and other caffeine-free clear liquids until you feel better. If you have kidney, heart, or liver disease and have to limit fluids, talk with your doctor before you increase the amount of fluids you drink. · If you do not feel like eating or drinking, try taking small sips of water, sports drinks, or other rehydration drinks. · Get plenty of rest.  To prevent dehydration  · Add more fluids to your diet and daily routine, unless your doctor has told you not to. · During hot weather, drink more fluids. Drink even more fluids if you exercise a lot. Stay away from drinks with alcohol or caffeine. · Watch for the symptoms of dehydration. These include:  ? A dry, sticky mouth. ? Dark yellow urine, and not much of it. ? Dry and sunken eyes. ? Feeling very tired. · Learn what problems can lead to dehydration. These include:  ? Diarrhea, fever, and vomiting. ? Any illness with a fever, such as pneumonia or the flu. ? Activities that cause heavy sweating, such as endurance races and heavy outdoor work in hot or humid weather. ?  Alcohol or drug abuse or withdrawal.  ? Certain medicines, such as cold and allergy pills (antihistamines), diet pills (diuretics), and laxatives. ? Certain diseases, such as diabetes, cancer, and heart or kidney disease. When should you call for help? Call 911 anytime you think you may need emergency care. For example, call if:    · You passed out (lost consciousness).    Call your doctor now or seek immediate medical care if:    · You are confused and cannot think clearly.     · You are dizzy or lightheaded, or you feel like you may faint.     · You have signs of needing more fluids. You have sunken eyes and a dry mouth, and you pass only a little dark urine.     · You cannot keep fluids down.    Watch closely for changes in your health, and be sure to contact your doctor if:    · You are not making tears.     · Your skin is very dry and sags slowly back into place after you pinch it.     · Your mouth and eyes are very dry. Where can you learn more? Go to http://melisa-jerome.info/. Enter W401 in the search box to learn more about \"Dehydration: Care Instructions. \"  Current as of: September 23, 2018  Content Version: 11.9  © 3676-9005 [employer]. Care instructions adapted under license by [employer] (which disclaims liability or warranty for this information). If you have questions about a medical condition or this instruction, always ask your healthcare professional. [place] [address] any warranty or liability for your use of this information. Patient Education        Learning About Hydronephrosis  What is hydronephrosis? Hydronephrosis is swelling of the kidneys. It is caused by a buildup of urine. This condition can happen if a tube that drains urine from your kidneys is blocked. The blockage can come from within the urinary tract or from pressure outside of the tract. Pregnancy is an example of an outside (external) cause.   This condition is often caused by a blockage such as a kidney stone, tumor, or blood clot. It also can be caused by a problem in your urinary system that you were born with (congenital problem). What are the symptoms? Some of the common symptoms are:  · Pain in one or both sides. · Stomach pain. · Blood in your urine. Some people have no symptoms. How is it diagnosed? Your doctor will do an ultrasound to look for a blockage in your urinary system. An ultrasound allows your doctor to see a picture of the organs and other structures in your belly (abdomen). You also may need blood and urine tests. How is it treated? Your treatment depends on the cause of the swelling. If it is caused by a blockage, your treatment will depend on the type of blockage you have. If the blockage is caused by a kidney stone, you may wait for the stone to pass. If hydronephrosis happens during pregnancy, it usually clears up on its own. You may need to have urine drained from your bladder or kidneys. A urinary catheter is a small, flexible tube that can be inserted through the urethra and into the bladder, allowing urine to drain. A nephrostomy catheter is a thin tube placed into your kidney to drain urine. Sometimes surgery is needed to clear the blockage. If you have a blockage, you should begin to feel better after the blockage is gone. Many people recover and have no long-term problems. But some may have kidney damage. If hydronephrosis was left untreated for a long time, the damage can be severe. Severe damage will require further treatment. Follow-up care is a key part of your treatment and safety. Be sure to make and go to all appointments, and call your doctor if you are having problems. It's also a good idea to know your test results and keep a list of the medicines you take. Where can you learn more? Go to http://melisa-jerome.info/. Enter S386 in the search box to learn more about \"Learning About Hydronephrosis. \"  Current as of: March 14, 2018  Content Version: 11.9  © 8648-2765 [employer]. Care instructions adapted under license by [employer] (which disclaims liability or warranty for this information). If you have questions about a medical condition or this instruction, always ask your healthcare professional. [address] any warranty or liability for your use of this information.

## 2019-07-02 NOTE — ED TRIAGE NOTES
Patient c/o RLQ abdominal pain that radiates to right flank. He states he had one episode of vomiting on Saturday. He states he has also had a productive cough. Patient has been taking Pepto Bismol. He states he is on his second bottle. He denies dysuria but reports urinary frequency but attributes it to being diabetic.

## 2019-07-02 NOTE — ED NOTES
Iman Nino is a 61 y.o. male that was discharged in stable condition. The patients diagnosis, condition and treatment were explained to  patient and aftercare instructions were given. The patient verbalized understanding. Patient armband removed and shredded.

## 2019-07-02 NOTE — ED PROVIDER NOTES
EMERGENCY DEPARTMENT HISTORY AND PHYSICAL EXAM    9:09 AM      Date: 7/2/2019  Patient Name: Pauline Rey    History of Presenting Illness     Chief Complaint   Patient presents with    Abdominal Pain    Flank Pain         History Provided By: Patient  Location/Duration/Severity/Modifying factors   Patient is a 27-year-old male with a history of diabetes, hypertension, and prostate disease the presents emergency department with complaint of 1 week of progressive congestion and mild cough that developed into right upper quadrant pain that is becoming more severe with nausea and decreased appetite. Patient has been feeling poorly for the last 2 days and unable to take any of his medications. Patient works at [employer] and has not been able to work to the way he is been feeling. He is also noted that he is having decreased urine output since is not eating and his urine is becoming concentrated and dark. In addition he is taking Pepto-Bismol for his symptoms and thinks he may be taking more than he supposed to be has used 2 bottles in the last several days. Pepto-Bismol does alleviate his pain briefly and then the pain comes right back. Patient has had some flatus however has not had a bowel movement since last Thursday. Patient denies smoking or drinking or drugs. Patient is retired  and goes to a [employer]. Patient describes his pain as 10 out of 10 cramping pain in the right upper quadrant that radiates to the epigastrium.           PCP: Lito Snatos NP    Current Facility-Administered Medications   Medication Dose Route Frequency Provider Last Rate Last Dose    0.9% sodium chloride infusion  125 mL/hr IntraVENous CONTINUOUS Deloris Shay MD   Stopped at 07/02/19 1146    0.9% sodium chloride infusion  150 mL/hr IntraVENous CONTINUOUS Deloris Shay MD         Current Outpatient Medications   Medication Sig Dispense Refill    amLODIPine-Olmesartan 10-40 mg tab Take by mouth.  loratadine 10 mg cap Take  by mouth.  naproxen (NAPROSYN) 375 mg tablet Take 1 Tab by mouth two (2) times daily (with meals) for 5 days. 10 Tab 0    HYDROcodone-acetaminophen (NORCO) 5-325 mg per tablet Take 1 Tab by mouth every four (4) hours as needed for Pain for up to 3 days. Max Daily Amount: 6 Tabs. 4 Tab 0    ondansetron (ZOFRAN ODT) 8 mg disintegrating tablet Take 1 Tab by mouth every eight (8) hours as needed for Nausea for up to 12 doses. 12 Tab 0    cephALEXin (KEFLEX) 500 mg capsule Take 1 Cap by mouth three (3) times daily for 7 days. 21 Cap 0    aspirin delayed-release 81 mg tablet Take 1 Tab by mouth daily. 120 Tab 1    fluticasone (ALLERGY RELIEF, FLUTICASONE,) 50 mcg/actuation nasal spray 2 Sprays by Both Nostrils route daily. 1 Bottle 5    ergocalciferol (ERGOCALCIFEROL) 50,000 unit capsule Take 1 Cap by mouth every seven (7) days. 12 Cap 2    tamsulosin (FLOMAX) 0.4 mg capsule Take 1 Cap by mouth daily. 90 Cap 1    azelastine (ASTELIN) 137 mcg (0.1 %) nasal spray 2 Sprays by Both Nostrils route two (2) times a day. 1 Bottle 3       Past History     Past Medical History:  Past Medical History:   Diagnosis Date    Depression     Diabetes ([facility] [address]     Heartburn     Hypertension     Joint pain     Kidney stone     Sinus disorder     Stress        Past Surgical History:  No past surgical history on file. Family History:  Family History   Problem Relation Age of Onset    Diabetes Mother     Hypertension Father     Cancer Maternal Grandfather     Cancer Paternal Grandfather         pancreatic       Social History:  Social History     Tobacco Use    Smoking status: Never Smoker    Smokeless tobacco: Never Used   Substance Use Topics    Alcohol use: Yes    Drug use: No       Allergies:  No Known Allergies      Review of Systems       Review of Systems   Constitutional: Positive for fatigue. Negative for activity change and fever.    HENT: Negative for congestion and rhinorrhea. Eyes: Negative for visual disturbance. Respiratory: Negative for shortness of breath. Cardiovascular: Negative for chest pain and palpitations. Gastrointestinal: Positive for abdominal pain, constipation and nausea. Negative for diarrhea and vomiting. Genitourinary: Positive for difficulty urinating. Negative for dysuria and hematuria. Musculoskeletal: Negative for back pain. Skin: Negative for rash. Neurological: Negative for dizziness, weakness and light-headedness. All other systems reviewed and are negative. Physical Exam     Visit Vitals  /80   Pulse (!) 58   Temp 98.4 °F (36.9 °C)   Resp 18   Ht 6' (1.829 m)   Wt 134.3 kg (296 lb)   SpO2 98%   BMI 40.14 kg/m²         Physical Exam   Constitutional: He is oriented to person, place, and time. He appears well-developed and well-nourished. No distress. HENT:   Head: Normocephalic and atraumatic. Right Ear: External ear normal.   Left Ear: External ear normal.   Nose: Nose normal.   Dry oral mucosa   Eyes: Pupils are equal, round, and reactive to light. Conjunctivae and EOM are normal. No scleral icterus. Neck: Normal range of motion. Neck supple. No JVD present. No tracheal deviation present. No thyromegaly present. Cardiovascular: Normal rate, regular rhythm, normal heart sounds and intact distal pulses. Exam reveals no gallop and no friction rub. No murmur heard. Pulmonary/Chest: Effort normal and breath sounds normal. He exhibits no tenderness. Abdominal: Soft. Bowel sounds are normal. He exhibits distension. There is tenderness. There is no rebound and no guarding. Mild distention most in the right upper quadrant with pain mild guarding, mild pain in the epigastrium, no pain noted in left upper quadrant and bilateral lower quadrants. Musculoskeletal: Normal range of motion. He exhibits no edema or tenderness. Lymphadenopathy:     He has no cervical adenopathy.    Neurological: He is alert and oriented to person, place, and time. No cranial nerve deficit. Coordination normal.   No sensory loss, Gait normal, Motor 5/5   Skin: Skin is warm and dry. Psychiatric: He has a normal mood and affect. His behavior is normal. Judgment and thought content normal.   Supportive family at bedside   Nursing note and vitals reviewed. Diagnostic Study Results     Labs -  Recent Results (from the past 12 hour(s))   URINALYSIS W/ RFLX MICROSCOPIC    Collection Time: 07/02/19  8:10 AM   Result Value Ref Range    Color YELLOW      Appearance CLEAR      Specific gravity 1.020 1.005 - 1.030      pH (UA) 5.5 5.0 - 8.0      Protein NEGATIVE  NEG mg/dL    Glucose NEGATIVE  NEG mg/dL    Ketone NEGATIVE  NEG mg/dL    Bilirubin NEGATIVE  NEG      Blood LARGE (A) NEG      Urobilinogen 1.0 0.2 - 1.0 EU/dL    Nitrites NEGATIVE  NEG      Leukocyte Esterase SMALL (A) NEG     CBC WITH AUTOMATED DIFF    Collection Time: 07/02/19  8:10 AM   Result Value Ref Range    WBC 10.1 4.6 - 13.2 K/uL    RBC 5.23 4.70 - 5.50 M/uL    HGB 15.3 13.0 - 16.0 g/dL    HCT 43.9 36.0 - 48.0 %    MCV 83.9 74.0 - 97.0 FL    MCH 29.3 24.0 - 34.0 PG    MCHC 34.9 31.0 - 37.0 g/dL    RDW 14.7 (H) 11.6 - 14.5 %    PLATELET 632 799 - 415 K/uL    MPV 10.3 9.2 - 11.8 FL    NEUTROPHILS 80 (H) 40 - 73 %    LYMPHOCYTES 11 (L) 21 - 52 %    MONOCYTES 9 3 - 10 %    EOSINOPHILS 0 0 - 5 %    BASOPHILS 0 0 - 2 %    ABS. NEUTROPHILS 8.1 (H) 1.8 - 8.0 K/UL    ABS. LYMPHOCYTES 1.1 0.9 - 3.6 K/UL    ABS. MONOCYTES 0.9 0.05 - 1.2 K/UL    ABS. EOSINOPHILS 0.0 0.0 - 0.4 K/UL    ABS.  BASOPHILS 0.0 0.0 - 0.1 K/UL    DF AUTOMATED     METABOLIC PANEL, COMPREHENSIVE    Collection Time: 07/02/19  8:10 AM   Result Value Ref Range    Sodium 138 136 - 145 mmol/L    Potassium 4.2 3.5 - 5.5 mmol/L    Chloride 103 100 - 108 mmol/L    CO2 26 21 - 32 mmol/L    Anion gap 9 3.0 - 18 mmol/L    Glucose 111 (H) 74 - 99 mg/dL    BUN 24 (H) 7.0 - 18 MG/DL    Creatinine 1.45 (H) 0.6 - 1.3 MG/DL BUN/Creatinine ratio 17 12 - 20      GFR est AA >60 >60 ml/min/1.73m2    GFR est non-AA 50 (L) >60 ml/min/1.73m2    Calcium 9.4 8.5 - 10.1 MG/DL    Bilirubin, total 0.6 0.2 - 1.0 MG/DL    ALT (SGPT) 37 16 - 61 U/L    AST (SGOT) 13 (L) 15 - 37 U/L    Alk. phosphatase 83 45 - 117 U/L    Protein, total 8.2 6.4 - 8.2 g/dL    Albumin 3.7 3.4 - 5.0 g/dL    Globulin 4.5 (H) 2.0 - 4.0 g/dL    A-G Ratio 0.8 0.8 - 1.7     LIPASE    Collection Time: 07/02/19  8:10 AM   Result Value Ref Range    Lipase 77 73 - 393 U/L   TROPONIN I    Collection Time: 07/02/19  8:10 AM   Result Value Ref Range    Troponin-I, QT 0.04 0.0 - 0.045 NG/ML   URINE MICROSCOPIC ONLY    Collection Time: 07/02/19  8:10 AM   Result Value Ref Range    WBC 4 to 10 0 - 4 /hpf    RBC 4 to 10 0 - 5 /hpf    Epithelial cells 1+ 0 - 5 /lpf    Hyaline cast 0 to 3 0 - 2 /lpf    Granular cast 0 to 3 NEG /lpf   SALICYLATE    Collection Time: 07/02/19  8:10 AM   Result Value Ref Range    Salicylate level <4.9 (L) 2.8 - 20.0 MG/DL   PH, VENOUS    Collection Time: 07/02/19  8:10 AM   Result Value Ref Range    VENOUS PH 7.42 7.32 - 7.42     EKG, 12 LEAD, INITIAL    Collection Time: 07/02/19  8:42 AM   Result Value Ref Range    Ventricular Rate 64 BPM    Atrial Rate 64 BPM    P-R Interval 176 ms    QRS Duration 102 ms    Q-T Interval 402 ms    QTC Calculation (Bezet) 414 ms    Calculated P Axis 59 degrees    Calculated R Axis 26 degrees    Calculated T Axis 41 degrees    Diagnosis       Normal sinus rhythm  Normal ECG  No previous ECGs available         Radiologic Studies -   CT ABD PELV WO CONT   Final Result   IMPRESSION[de-identified]      1.  Mild right hydronephrosis and hydroureter secondary to obstruction by 5 mm   distal right ureteral stone. 2. No additional renal stones identified. Suspect small left renal cyst.   3. Otherwise unremarkable noncontrast exam.      Thank you for this referral.            Medical Decision Making   I am the first provider for this patient.     I reviewed the vital signs, available nursing notes, past medical history, past surgical history, family history and social history. Vital Signs-Reviewed the patient's vital signs. EKG: Normal sinus rhythm at 64 no STEMI    Records Reviewed: Nursing Notes and Old Medical Records (Time of Review: 9:09 AM)    ED Course: Progress Notes, Reevaluation, and Consults:    Patient is improving but has some mild renal insufficiency and a CT that shows a 5 mm obstructing stone. I discussed the case with Dr. Skylar Xavier and he would like to see if the patient to be hydrated and his pain can be controlled we will see him on Friday in the office. Dr. Skylar Xavier does not believe this is an infected stone and feels that a small dose of Toradol would be reasonable. If the patient is not feeling better patient can be admitted and he will do an intervention tomorrow. I discussed this with the patient and he would like to go home if at all possible. We will continue hydration, dose of Toradol, and make a shared decision with the patient and family. Jazz Klein,  11:45 AM    Patient is urinating better and pain is solved at this time. He would like to go home and follow-up with Dr. Skylar Xavier on Friday. He notes he may been having some fever at home so we will give him an antibiotic as well as pain control as well as nausea medication. Patient will follow closely as outpatient and return if at all worsened or concerned. Workup and recommendations were reviewed with the patient and all questions were answered. The patient understands the plan and will proceed with close outpatient care. I have encouraged the patient to return if at all worsened or concerned.    Jazz Klein DO 1:12 PM        Provider Notes (Medical Decision Making):   MDM  Number of Diagnoses or Management Options  Diagnosis management comments: Patient is a 68-year-old male with a history of hypertension, diabetes, and no surgical history the presents with complaint of increasing abdominal pain with associated URI symptoms but now with decreased urine output and abdominal distention with constipation. Patient appears dehydrated and will hydrate, trend renal function, LFTs, lipase, venous pH for diabetic emergency, supportive care, salicylate level due to Pepto-Bismol use, and then reevaluate. Procedures        Diagnosis     Clinical Impression:   1. Ureteral stone with hydronephrosis    2. Dehydration    3. Kidney stone        Disposition: DC    Follow-up Information     Follow up With Specialties Details Why Brayden Underwood MD Urology In 3 days without fail, call to get your appt time  [address] [facility] [id number] [phone]      [id number] [facility] EMERGENCY DEPT Emergency Medicine  As needed, If symptoms worsen [address] [facility]  [phone]           Patient's Medications   Start Taking    CEPHALEXIN (KEFLEX) 500 MG CAPSULE    Take 1 Cap by mouth three (3) times daily for 7 days. HYDROCODONE-ACETAMINOPHEN (NORCO) 5-325 MG PER TABLET    Take 1 Tab by mouth every four (4) hours as needed for Pain for up to 3 days. Max Daily Amount: 6 Tabs. NAPROXEN (NAPROSYN) 375 MG TABLET    Take 1 Tab by mouth two (2) times daily (with meals) for 5 days. ONDANSETRON (ZOFRAN ODT) 8 MG DISINTEGRATING TABLET    Take 1 Tab by mouth every eight (8) hours as needed for Nausea for up to 12 doses. Continue Taking    AMLODIPINE-OLMESARTAN 10-40 MG TAB    Take  by mouth. ASPIRIN DELAYED-RELEASE 81 MG TABLET    Take 1 Tab by mouth daily. AZELASTINE (ASTELIN) 137 MCG (0.1 %) NASAL SPRAY    2 Sprays by Both Nostrils route two (2) times a day. ERGOCALCIFEROL (ERGOCALCIFEROL) 50,000 UNIT CAPSULE    Take 1 Cap by mouth every seven (7) days. FLUTICASONE (ALLERGY RELIEF, FLUTICASONE,) 50 MCG/ACTUATION NASAL SPRAY    2 Sprays by Both Nostrils route daily. LORATADINE 10 MG CAP    Take  by mouth. TAMSULOSIN (FLOMAX) 0.4 MG CAPSULE    Take 1 Cap by mouth daily. These Medications have changed    No medications on file   Stop Taking    LIDOCAINE (LIDODERM) 5 %    1 Patch every twelve (12) hours. LOSARTAN (COZAAR) 25 MG TABLET    Take  by mouth daily. MELOXICAM (MOBIC) 15 MG TABLET    Take 1 Tab by mouth daily. NIFEDIPINE ER (ADALAT CC) 90 MG ER TABLET    Take 1 Tab by mouth daily. TRAMADOL (ULTRAM) 50 MG TABLET    Take 50 mg by mouth every six (6) hours as needed for Pain. VALSARTAN (DIOVAN) 320 MG TABLET    Take 1 Tab by mouth daily. Disclaimer: Sections of this note are dictated using utilizing voice recognition software. Minor typographical errors may be present. If questions arise, please do not hesitate to contact me or call our department.

## 2019-07-05 ENCOUNTER — OFFICE VISIT (OUTPATIENT)
Dept: UROLOGY | Age: 59
End: 2019-07-05

## 2019-07-05 VITALS
DIASTOLIC BLOOD PRESSURE: 82 MMHG | HEIGHT: 72 IN | WEIGHT: 294 LBS | HEART RATE: 68 BPM | SYSTOLIC BLOOD PRESSURE: 158 MMHG | BODY MASS INDEX: 39.82 KG/M2 | OXYGEN SATURATION: 96 %

## 2019-07-05 DIAGNOSIS — R10.31 RIGHT LOWER QUADRANT ABDOMINAL PAIN: ICD-10-CM

## 2019-07-05 DIAGNOSIS — N20.1 RIGHT URETERAL STONE: Primary | ICD-10-CM

## 2019-07-05 LAB
BILIRUB UR QL STRIP: NEGATIVE
GLUCOSE UR-MCNC: NEGATIVE MG/DL
KETONES P FAST UR STRIP-MCNC: NEGATIVE MG/DL
PH UR STRIP: 5 [PH] (ref 4.6–8)
PROT UR QL STRIP: NEGATIVE
SP GR UR STRIP: 1.03 (ref 1–1.03)
UA UROBILINOGEN AMB POC: NORMAL (ref 0.2–1)
URINALYSIS CLARITY POC: CLEAR
URINALYSIS COLOR POC: YELLOW
URINE BLOOD POC: NORMAL
URINE LEUKOCYTES POC: NEGATIVE
URINE NITRITES POC: NEGATIVE

## 2019-07-05 RX ORDER — HYDROCODONE BITARTRATE AND ACETAMINOPHEN 5; 325 MG/1; MG/1
1 TABLET ORAL
Qty: 10 TAB | Refills: 0 | Status: SHIPPED | OUTPATIENT
Start: 2019-07-05 | End: 2019-07-08

## 2019-07-05 RX ORDER — AMLODIPINE BESYLATE AND BENAZEPRIL HYDROCHLORIDE 10; 40 MG/1; MG/1
CAPSULE ORAL
COMMUNITY
Start: 2018-08-22 | End: 2020-01-03

## 2019-07-05 NOTE — PATIENT INSTRUCTIONS
Learning About Diet for Kidney Stone Prevention  What are kidney stones? Kidney stones are made of salts and minerals in the urine that form small \"tamie. \" Stones can form in the kidneys and the ureters (the tubes that lead from the kidneys to the bladder). They can also form in the bladder. Stones may not cause a problem as long as they stay in the kidneys. But they can cause sudden, severe pain. Pain is most likely when the stones travel from the kidneys to the bladder. Kidney stones can cause bloody urine. Kidney stones often run in families. You are more likely to get them if you don't drink enough fluids, mainly water. Certain foods and drinks and some dietary supplements may also increase your risk for kidney stones if you consume too much of them. What can you do to prevent kidney stones? Changing what you eat may not prevent all types of kidney stones. But for people who have a history of certain kinds of kidney stones, some changes in diet may help. A dietitian can help you set up a meal plan that includes healthy, low-oxalate choices. Here are some general guidelines to get you started. Plan your meals and snacks around foods that are low in oxalate. These foods include:  · Corn, kale, parsnips, and squash,. · Beef, chicken, pork, turkey, and fish. · Milk, butter, cheese, and yogurt. You can eat certain foods that are medium-high in oxalate, but eat them only once in a while. These foods include:  · Bread. · Brown rice. · English muffins. · Figs. · Popcorn. · String beans. · Tomatoes. Limit very high-oxalate foods, including:  · Black tea. · Coffee. · Chocolate. · Dark green vegetables. · Nuts. Here are some other things you can do to help prevent kidney stones. · Drink plenty of fluids. If you have kidney, heart, or liver disease and have to limit fluids, talk with your doctor before you increase the amount of fluids you drink.   · Do not take more than the recommended daily dose of vitamins C and D.  · Limit the salt in your diet. · Eat a balanced diet that is not too high in protein. Follow-up care is a key part of your treatment and safety. Be sure to make and go to all appointments, and call your doctor if you are having problems. It's also a good idea to know your test results and keep a list of the medicines you take. Where can you learn more? Go to http://melisa-jerome.info/. Enter C138 in the search box to learn more about \"Learning About Diet for Kidney Stone Prevention. \"  Current as of: March 28, 2018  Content Version: 11.9  © 0424-1957 [employer]. Care instructions adapted under license by [employer] (which disclaims liability or warranty for this information). If you have questions about a medical condition or this instruction, always ask your healthcare professional. [address] any warranty or liability for your use of this information.

## 2019-07-05 NOTE — PROGRESS NOTES
Mr. Franci Orellana has a reminder for a \"due or due soon\" health maintenance. I have asked that he contact his primary care provider for follow-up on this health maintenance.

## 2019-07-05 NOTE — PROGRESS NOTES
Chief Complaint   Patient presents with    New Patient    Kidney Stone    Hydronephrosis    Abdominal Pain     6/10 right       HISTORY OF PRESENT ILLNESS:  Jayce Hernandez is a 61 y.o. male who presents today with a several day history of severe right-sided flank pain rating around anteriorly and consistent with a kidney stone. He was in the emergency room on July 2 with this and the ER doctor called me and we discussed his condition. In summary he has never had a stone before. He does have diabetes and is in the process of losing weight and he says that over the past 4 years he has lost about 60 pounds just by changing his eating habits. He does have a family history of significant diseases but none of these are related to kidney stones. At the present time he was given some pain medicine from the ER and referred here but he feels better and took his last pain medication this morning. He works as a supervisor at [employer]. ROS documented on the chart    Past Medical History:   Diagnosis Date    Depression     Diabetes ([facility] [address]     Heartburn     Hypertension     Joint pain     Kidney stone     Sinus disorder     Stress        History reviewed. No pertinent surgical history. Social History     Tobacco Use    Smoking status: Never Smoker    Smokeless tobacco: Never Used   Substance Use Topics    Alcohol use: Not Currently    Drug use: No       No Known Allergies    Family History   Problem Relation Age of Onset    Diabetes Mother     Heart Disease Mother     Hypertension Mother     Hypertension Father     Heart Disease Father     Cancer Maternal Grandfather     Cancer Paternal Grandfather         pancreatic    Hypertension Brother        Current Outpatient Medications   Medication Sig Dispense Refill    amLODIPine-benazepril (LOTREL) 10-40 mg per capsule Take  by mouth.  metformin HCl (METFORMIN PO) Take  by mouth.       HYDROcodone-acetaminophen ([address]) 5-325 mg per tablet Take 1 Tab by mouth every four (4) hours as needed for Pain for up to 3 days. Max Daily Amount: 6 Tabs. 10 Tab 0    loratadine 10 mg cap Take  by mouth.  aspirin delayed-release 81 mg tablet Take 1 Tab by mouth daily. 120 Tab 1    ergocalciferol (ERGOCALCIFEROL) 50,000 unit capsule Take 1 Cap by mouth every seven (7) days. 12 Cap 2    azelastine (ASTELIN) 137 mcg (0.1 %) nasal spray 2 Sprays by Both Nostrils route two (2) times a day. 1 Bottle 3    naproxen (NAPROSYN) 375 mg tablet Take 1 Tab by mouth two (2) times daily (with meals) for 5 days. 10 Tab 0    ondansetron (ZOFRAN ODT) 8 mg disintegrating tablet Take 1 Tab by mouth every eight (8) hours as needed for Nausea for up to 12 doses. 12 Tab 0    cephALEXin (KEFLEX) 500 mg capsule Take 1 Cap by mouth three (3) times daily for 7 days. 21 Cap 0    fluticasone (ALLERGY RELIEF, FLUTICASONE,) 50 mcg/actuation nasal spray 2 Sprays by Both Nostrils route daily. 1 Bottle 5    tamsulosin (FLOMAX) 0.4 mg capsule Take 1 Cap by mouth daily. 90 Cap 1       No results found for: PSA, PSA2, PSAR1, PSA1, PSAR2, PSA3, PSAR3, [id number], [id number], PSALT, 28500, PSAEXT           PHYSICAL EXAMINATION:   Visit Vitals  /82 (BP 1 Location: Left arm, BP Patient Position: Sitting)   Pulse 68   Ht 6' (1.829 m)   Wt 294 lb (133.4 kg)   SpO2 96%   BMI 39.87 kg/m²     Constitutional: WDWN, Pleasant and appropriate affect, No acute distress. CV:  No peripheral swelling noted  Respiratory: No respiratory distress or difficulties  Abdomen:  No abdominal masses or tenderness. No CVA tenderness. No inguinal hernias noted.  Male:    RICARDO:Perineum normal to visual inspection, no erythema or irritation, prostate exam is declined today because he just had that about 3 weeks ago for his annual exam through several service medical requirements. SCROTUM:  No scrotal rash or lesions noticed. Normal bilateral testes and epididymis.    PENIS: Urethral meatus normal in location and size. No urethral discharge. Skin: No jaundice. Neuro/Psych:  Alert and oriented x 3, affect appropriate. Lymphatic:   No enlarged inguinal lymph nodes. Results for orders placed or performed in visit on 07/05/19   AMB POC URINALYSIS DIP STICK AUTO W/O MICRO   Result Value Ref Range    Color (UA POC) Yellow     Clarity (UA POC) Clear     Glucose (UA POC) Negative Negative    Bilirubin (UA POC) Negative Negative    Ketones (UA POC) Negative Negative    Specific gravity (UA POC) 1.030 1.001 - 1.035    Blood (UA POC) Trace Negative    pH (UA POC) 5.0 4.6 - 8.0    Protein (UA POC) Negative Negative    Urobilinogen (UA POC) 0.2 mg/dL 0.2 - 1    Nitrites (UA POC) Negative Negative    Leukocyte esterase (UA POC) Negative Negative         REVIEW OF LABS AND IMAGING:     Imaging Report Reviewed? YES     Images Reviewed? YES           Other Lab Data Reviewed? YES         ASSESSMENT:     ICD-10-CM ICD-9-CM    1. Right ureteral stone N20.1 592.1 AMB POC URINALYSIS DIP STICK AUTO W/O MICRO      HYDROcodone-acetaminophen (NORCO) 5-325 mg per tablet   2. Right lower quadrant abdominal pain R10.31 789.03 AMB POC URINALYSIS DIP STICK AUTO W/O MICRO            PLAN / DISCUSSION: : He does have indeed a 5 mm stone in the distal ureter below the pelvic brim on the right. He states that now he is having a little bit of urgency urinary frequency and I think the stone has probably advanced down closer to the ureterovesical orifice. At any rate he is not having any fever or chills and his pain is generally under control. At this time my advice to him would be his options are to go ahead and watch this conservatively and try to pass it on his own or have it surgically removed. I have recommended watching this since he has no systemic indications of either sepsis or intractable pain.   He is agreeable to do that so I have written him another prescription for Norco and have specifically discussed with him that we would not write any further pain medicines and that if he did have significant pain or if he runs out of this medicine, that is about enough pain and its time to go ahead and deal with the stone. He is agreeable to all of those. I will tentatively see him back in a month and if he does develop pain and wishes to proceed with surgery, he is to call the office here and will make arrangements to proceed with that. The patient expresses understanding and agreement of the discussion and plan. Saad Forrester MD on 7/5/2019         Please note:  Voice recognition was used to generate this report, which may have resulted in some phonetic based errors in grammar and contents. Even though attempts were made to correct all the mistakes, some may have been missed, and remained in the body of the document.

## 2022-03-19 PROBLEM — F32.A DEPRESSION: Status: ACTIVE | Noted: 2017-09-19

## 2022-03-19 PROBLEM — N28.1 RENAL CYST: Status: ACTIVE | Noted: 2018-05-21

## 2022-03-19 PROBLEM — M72.2 PLANTAR FASCIITIS OF RIGHT FOOT: Status: ACTIVE | Noted: 2018-05-21

## 2022-03-19 PROBLEM — E66.01 OBESITY, MORBID: Status: ACTIVE | Noted: 2018-04-23

## 2022-03-19 PROBLEM — I10 HTN, GOAL BELOW 130/80: Status: ACTIVE | Noted: 2017-09-19

## 2022-03-19 PROBLEM — E11.9 CONTROLLED TYPE 2 DIABETES MELLITUS WITHOUT COMPLICATION, WITHOUT LONG-TERM CURRENT USE OF INSULIN: Status: ACTIVE | Noted: 2017-09-19

## 2022-03-20 PROBLEM — M17.0 OSTEOARTHRITIS OF BOTH KNEES: Status: ACTIVE | Noted: 2017-09-19

## 2022-03-20 PROBLEM — N20.0 RENAL CALCULUS, RIGHT: Status: ACTIVE | Noted: 2018-05-21

## 2025-03-31 ENCOUNTER — OFFICE VISIT (OUTPATIENT)
Age: 65
End: 2025-03-31
Payer: OTHER GOVERNMENT

## 2025-03-31 VITALS
SYSTOLIC BLOOD PRESSURE: 138 MMHG | HEART RATE: 75 BPM | DIASTOLIC BLOOD PRESSURE: 84 MMHG | OXYGEN SATURATION: 97 % | TEMPERATURE: 97.8 F | BODY MASS INDEX: 38.24 KG/M2 | WEIGHT: 298 LBS | HEIGHT: 74 IN | RESPIRATION RATE: 18 BRPM

## 2025-03-31 DIAGNOSIS — I10 HTN, GOAL BELOW 130/80: ICD-10-CM

## 2025-03-31 DIAGNOSIS — G25.81 RLS (RESTLESS LEGS SYNDROME): ICD-10-CM

## 2025-03-31 DIAGNOSIS — G47.33 OSA (OBSTRUCTIVE SLEEP APNEA): Primary | ICD-10-CM

## 2025-03-31 PROCEDURE — 3075F SYST BP GE 130 - 139MM HG: CPT | Performed by: INTERNAL MEDICINE

## 2025-03-31 PROCEDURE — 99204 OFFICE O/P NEW MOD 45 MIN: CPT | Performed by: INTERNAL MEDICINE

## 2025-03-31 PROCEDURE — 3079F DIAST BP 80-89 MM HG: CPT | Performed by: INTERNAL MEDICINE

## 2025-03-31 RX ORDER — AMLODIPINE BESYLATE 10 MG/1
10 TABLET ORAL
COMMUNITY
Start: 2025-01-14

## 2025-03-31 ASSESSMENT — SLEEP AND FATIGUE QUESTIONNAIRES
HOW LIKELY ARE YOU TO NOD OFF OR FALL ASLEEP WHILE LYING DOWN TO REST IN THE AFTERNOON WHEN CIRCUMSTANCES PERMIT: MODERATE CHANCE OF DOZING
HOW LIKELY ARE YOU TO NOD OFF OR FALL ASLEEP WHILE SITTING AND READING: MODERATE CHANCE OF DOZING
HOW LIKELY ARE YOU TO NOD OFF OR FALL ASLEEP WHILE SITTING INACTIVE IN A PUBLIC PLACE: MODERATE CHANCE OF DOZING
HOW LIKELY ARE YOU TO NOD OFF OR FALL ASLEEP WHEN YOU ARE A PASSENGER IN A CAR FOR AN HOUR WITHOUT A BREAK: MODERATE CHANCE OF DOZING
ESS TOTAL SCORE: 15
HOW LIKELY ARE YOU TO NOD OFF OR FALL ASLEEP WHILE SITTING AND TALKING TO SOMEONE: MODERATE CHANCE OF DOZING
HOW LIKELY ARE YOU TO NOD OFF OR FALL ASLEEP WHILE WATCHING TV: MODERATE CHANCE OF DOZING
HOW LIKELY ARE YOU TO NOD OFF OR FALL ASLEEP IN A CAR, WHILE STOPPED FOR A FEW MINUTES IN TRAFFIC: SLIGHT CHANCE OF DOZING
HOW LIKELY ARE YOU TO NOD OFF OR FALL ASLEEP WHILE SITTING QUIETLY AFTER LUNCH WITHOUT ALCOHOL: MODERATE CHANCE OF DOZING

## 2025-03-31 ASSESSMENT — PATIENT HEALTH QUESTIONNAIRE - PHQ9
SUM OF ALL RESPONSES TO PHQ QUESTIONS 1-9: 2
1. LITTLE INTEREST OR PLEASURE IN DOING THINGS: NOT AT ALL
SUM OF ALL RESPONSES TO PHQ QUESTIONS 1-9: 2
2. FEELING DOWN, DEPRESSED OR HOPELESS: MORE THAN HALF THE DAYS

## 2025-03-31 NOTE — PROGRESS NOTES
Maycol Lewis presents today for   Chief Complaint   Patient presents with    New Patient       Is someone accompanying this pt? no    Is the patient using any DME equipment during OV? no    -DME Company n/a    Have you ever had a sleep study done before? yes,     Depression Screening:      3/31/2025     8:17 AM   PHQ-9    Little interest or pleasure in doing things 0   Feeling down, depressed, or hopeless 2   PHQ-2 Score 2   PHQ-9 Total Score 2        [place] Sleepiness Scale:      3/31/2025     8:18 AM   Sleep Medicine   Sitting and reading 2   Watching TV 2   Sitting, inactive in a public place (e.g. a theatre or a meeting) 2   As a passenger in a car for an hour without a break 2   Lying down to rest in the afternoon when circumstances permit 2   Sitting and talking to someone 2   Sitting quietly after a lunch without alcohol 2   In a car, while stopped for a few minutes in traffic 1   [place] Sleepiness Score 15   Neck (Inches) 17       Stop-Bang:       No data to display                  Coordination of Care:  1. Have you been to the ER, urgent care clinic since your last visit? Hospitalized since your last visit? no    2. Have you seen or consulted any other health care providers outside of the [facility] since your last visit? Include any pap smears or colon screening. no    Medication list has been updated according to patient.

## 2025-03-31 NOTE — PROGRESS NOTES
Reynaldo Fox M.D  Pulmonary Critical Care & Sleep Medicine     Sleep Medicine Note    Name: Maycol Lewis     : 1960     Date: 3/31/2025      Referring provider: Balwinder Ortiz MD   PCP: Ivan Hughes, OSCAR - CNP   IMPRESSION:   64-year-old gentleman with morbid obesity, prior history of sleep apnea, stopped using CPAP even if he was getting benefit out of using CPAP, comes back for follow-up with snoring snore arousal and ESS of 15  OLMAN:   No history of COPD or heart failure  Would prefer to get a sleep study done in the home  Will order home sleep study and follow-up  Obesity/Overweight:   3/31/25 Estimated body mass index is 38.26 kg/m² as calculated from the following:    Height as of this encounter: 1.88 m (6' 2\").    Weight as of this encounter: 135.2 kg (298 lb).   HTN:   Continue management per PCP  Optimal treatment of OLMAN will help.   RLS:   Tingling and numbness on legs and some restlessness requiring to move at night  Suggest possible RLS  Optimal treatment of OLMAN should help    CPAP dependency:   Was prescribed CPAP unknown settings  As per the patient he had improvement in fatigue sleepiness and memory with the use of CPAP  He had difficulty tolerating the pressure in the past   Plan:  Home sleep study  Weight loss is encouraged  Sleep hygiene measures were discussed with patient at length.   and workplace safety reviewed.  Drowsy and inattentive driving should be avoided.  Follow Up Visit 2025   Orders: Labs/sleep study  Orders Placed This Encounter   Procedures    PAT - Home Sleep Test     Standing Status:   Future     Expected Date:   3/31/2025     Expiration Date:   2025     Scheduling Instructions:      Read By Reynaldo Fox     Location For Sleep Study:   [facility]      Meds ordered on this visit  No orders of the defined types were placed in this encounter.        Old records reviewed discussed results and management plan with patient  Plan of care

## 2025-04-29 ENCOUNTER — HOSPITAL ENCOUNTER (OUTPATIENT)
Dept: SLEEP MEDICINE | Facility: HOSPITAL | Age: 65
Discharge: HOME | End: 2025-05-02
Attending: INTERNAL MEDICINE
Payer: MEDICARE

## 2025-04-29 ENCOUNTER — TELEPHONE (OUTPATIENT)
Facility: CLINIC | Age: 65
End: 2025-04-29

## 2025-04-29 DIAGNOSIS — G47.33 OSA (OBSTRUCTIVE SLEEP APNEA): ICD-10-CM

## 2025-04-29 DIAGNOSIS — G25.81 RLS (RESTLESS LEGS SYNDROME): ICD-10-CM

## 2025-04-29 DIAGNOSIS — I10 HTN, GOAL BELOW 130/80: ICD-10-CM

## 2025-04-29 PROCEDURE — 95800 SLP STDY UNATTENDED: CPT

## 2025-04-30 PROBLEM — G47.33 OSA (OBSTRUCTIVE SLEEP APNEA): Status: ACTIVE | Noted: 2025-04-30

## 2025-04-30 NOTE — PROCEDURES
Reynaldo Fox M.D  Pulmonary Critical Care & Sleep Medicine    Sleep Study Report  Date: 4/30/2025   Name: Maycol Lewis  YOB: 1960    Procedures    HST reported on 4/30/25   INDICATION: Snoring snore arousal, prior diagnosis of sleep apnea was on CPAP stopped using it, ESS 15  CONCLUSION     Moderate complex sleep apnea syndrome with AHI of 21, and respiratory disturbance index (RDI) of 29. Central apnea index 6.    AHI by AASM criteria 21 /hr with drop in volume by 30% and 3% oxygen desaturation & by CMS criteria 12 /hr with drop in volume by 30% and oxygen desaturation of 4%.    REM AHI: 5.    Calculated sleep time: 3 hours and 58 minutes.  Lowest oxygen saturation 87%.  Oxygen desaturation index 20. Time below 88 for 0.1 min.    Supine AHI: 26.    Sleep Latency: 5 min. REM latency 106 min.    Significant snoring events reported.    No significant arrhythmia seen.  Occasional PVCs reported.    Home sleep apnea test was completed with ticcklePAT, a type III device including the following measurement/data: Peripheral arterial tone, actigraphy, body position, snore, pulse oximetry, sleep staging and heart rate.  This report was generated based on review of flow data from the study  Study was adequate for interpretation    RECOMMENDATIONS:     Patient has follow up appointment on July 8, 2025, I will ask my staff to see if patient can come sooner and will discuss results of sleep study and decide further course of action.     Consider sleep apnea treatment as appropriate for this patient.    Treatment options may include positive airway pressure device such as continuous-CPAP, or auto adjusting-APAP and bilevel.  Oral appliance implementation during sleep or Inspire/ Upper airway surgery may be considered in selected patient as deemed suitable by appropriate specialist.    Patient with possibility of complex sleep apnea consider dedicated CPAP/BiPAP titration study.    If APAP is utilized for titration

## 2025-05-02 ENCOUNTER — CLINICAL DOCUMENTATION (OUTPATIENT)
Age: 65
End: 2025-05-02

## 2025-05-21 ENCOUNTER — OFFICE VISIT (OUTPATIENT)
Age: 65
End: 2025-05-21
Payer: MEDICARE

## 2025-05-21 VITALS
HEIGHT: 74 IN | WEIGHT: 294 LBS | BODY MASS INDEX: 37.73 KG/M2 | TEMPERATURE: 98 F | OXYGEN SATURATION: 98 % | SYSTOLIC BLOOD PRESSURE: 158 MMHG | HEART RATE: 90 BPM | RESPIRATION RATE: 18 BRPM | DIASTOLIC BLOOD PRESSURE: 94 MMHG

## 2025-05-21 DIAGNOSIS — G47.33 OSA (OBSTRUCTIVE SLEEP APNEA): Primary | ICD-10-CM

## 2025-05-21 DIAGNOSIS — I10 HTN, GOAL BELOW 130/80: ICD-10-CM

## 2025-05-21 DIAGNOSIS — G25.81 RLS (RESTLESS LEGS SYNDROME): ICD-10-CM

## 2025-05-21 DIAGNOSIS — Z99.89 CPAP (CONTINUOUS POSITIVE AIRWAY PRESSURE) DEPENDENCE: ICD-10-CM

## 2025-05-21 PROCEDURE — 1123F ACP DISCUSS/DSCN MKR DOCD: CPT | Performed by: INTERNAL MEDICINE

## 2025-05-21 PROCEDURE — 3080F DIAST BP >= 90 MM HG: CPT | Performed by: INTERNAL MEDICINE

## 2025-05-21 PROCEDURE — G8417 CALC BMI ABV UP PARAM F/U: HCPCS | Performed by: INTERNAL MEDICINE

## 2025-05-21 PROCEDURE — 99214 OFFICE O/P EST MOD 30 MIN: CPT | Performed by: INTERNAL MEDICINE

## 2025-05-21 PROCEDURE — 3077F SYST BP >= 140 MM HG: CPT | Performed by: INTERNAL MEDICINE

## 2025-05-21 PROCEDURE — 4004F PT TOBACCO SCREEN RCVD TLK: CPT | Performed by: INTERNAL MEDICINE

## 2025-05-21 PROCEDURE — 3017F COLORECTAL CA SCREEN DOC REV: CPT | Performed by: INTERNAL MEDICINE

## 2025-05-21 PROCEDURE — G8428 CUR MEDS NOT DOCUMENT: HCPCS | Performed by: INTERNAL MEDICINE

## 2025-05-21 ASSESSMENT — SLEEP AND FATIGUE QUESTIONNAIRES
HOW LIKELY ARE YOU TO NOD OFF OR FALL ASLEEP WHILE WATCHING TV: SLIGHT CHANCE OF DOZING
HOW LIKELY ARE YOU TO NOD OFF OR FALL ASLEEP WHEN YOU ARE A PASSENGER IN A CAR FOR AN HOUR WITHOUT A BREAK: MODERATE CHANCE OF DOZING
HOW LIKELY ARE YOU TO NOD OFF OR FALL ASLEEP WHILE SITTING AND TALKING TO SOMEONE: SLIGHT CHANCE OF DOZING
HOW LIKELY ARE YOU TO NOD OFF OR FALL ASLEEP WHILE SITTING QUIETLY AFTER LUNCH WITHOUT ALCOHOL: MODERATE CHANCE OF DOZING
HOW LIKELY ARE YOU TO NOD OFF OR FALL ASLEEP WHILE SITTING AND READING: SLIGHT CHANCE OF DOZING
HOW LIKELY ARE YOU TO NOD OFF OR FALL ASLEEP WHILE SITTING INACTIVE IN A PUBLIC PLACE: MODERATE CHANCE OF DOZING
HOW LIKELY ARE YOU TO NOD OFF OR FALL ASLEEP WHILE LYING DOWN TO REST IN THE AFTERNOON WHEN CIRCUMSTANCES PERMIT: HIGH CHANCE OF DOZING
ESS TOTAL SCORE: 13
HOW LIKELY ARE YOU TO NOD OFF OR FALL ASLEEP IN A CAR, WHILE STOPPED FOR A FEW MINUTES IN TRAFFIC: SLIGHT CHANCE OF DOZING

## 2025-05-21 ASSESSMENT — PATIENT HEALTH QUESTIONNAIRE - PHQ9
SUM OF ALL RESPONSES TO PHQ QUESTIONS 1-9: 0
2. FEELING DOWN, DEPRESSED OR HOPELESS: NOT AT ALL
SUM OF ALL RESPONSES TO PHQ QUESTIONS 1-9: 0
1. LITTLE INTEREST OR PLEASURE IN DOING THINGS: NOT AT ALL
SUM OF ALL RESPONSES TO PHQ QUESTIONS 1-9: 0
SUM OF ALL RESPONSES TO PHQ QUESTIONS 1-9: 0

## 2025-05-21 NOTE — PROGRESS NOTES
Maycol Lewis presents today for   Chief Complaint   Patient presents with    Follow-up    Results     Sleep study: study date 2025.       Is someone accompanying this pt? no    Is the patient using any DME equipment during OV? no    -DME Company: N/A    Depression Screenin/21/2025     2:29 PM   PHQ-9    Little interest or pleasure in doing things 0   Feeling down, depressed, or hopeless 0   PHQ-2 Score 0   PHQ-9 Total Score 0        [place] Sleepiness Scale:      2025     2:31 PM   Sleep Medicine   Sitting and reading 1   Watching TV 1   Sitting, inactive in a public place (e.g. a theatre or a meeting) 2   As a passenger in a car for an hour without a break 2   Lying down to rest in the afternoon when circumstances permit 3   Sitting and talking to someone 1   Sitting quietly after a lunch without alcohol 2   In a car, while stopped for a few minutes in traffic 1   [place] Sleepiness Score 13         Coordination of Care:  1. Have you been to the ER, urgent care clinic since your last visit? Hospitalized since your last visit?  no    2. Have you seen or consulted any other health care providers outside of the [facility] since your last visit? Include any pap smears or colon screening. no    Medication list has been updated according to patient.  
pound lost about 4 pound  Weight loss is encouraged  HTN: Slightly elevated BP noted in clinic today  Continue management per PCP  Emphasized on importance of HTN management  Optimal treatment of OLMAN will help.   RLS:   Tingling and numbness on legs and some restlessness requiring to move at night  Suggest possible RLS  Optimal treatment of OLMAN should help   Plan:  Start Auto CPAP 5-15   Prescription given to patient for [facility]  Weight loss is encouraged  Sleep hygiene measures were discussed with patient at length.   and workplace safety reviewed.  Drowsy and inattentive driving should be avoided.  Follow Up Visit 8/20/2025   Orders: Labs/sleep study  Orders Placed This Encounter   Procedures    DME - DURABLE MEDICAL EQUIPMENT     Legth of need (MIKAYLA): Lifetime (99)  Dx: OLMAN. Moderate AHI 21 RDI 29  Start patient on Auto CPAP 5-15 cwp with a Flex/EPR: 2.  PLEASE ISSUE A RESMED UNIT. Fit mask for patient comfort. Provide CPAP machine and all needed supplies to include filter, heated circuit, mask, humidifier, etc.. Provide PAP education please. Climate control per patient preference. Smart start/stop also per patient preference.     PLEASE ENSURE THAT PAP DEVICE IS TAGGED UNDER [facility]    Please attach CPAP to Dr Reynaldo Fox office and send records to fax [phone]      Meds ordered on this visit  No orders of the defined types were placed in this encounter.        Old records reviewed discussed results and management plan with patient  Plan of care discussed with patient.    The note was completed using Dragon voice recognition software and some unrecognized transcription errors may be present   Follow up: 5/21/25 : Patient came to review sleep study finding, sleep study results reviewed with the patient, patient denies any new symptoms, patient denies any change in medication or other medical issues from his last visit.  Sleep study as outlined below showed moderate to severe